# Patient Record
Sex: MALE | Race: WHITE
[De-identification: names, ages, dates, MRNs, and addresses within clinical notes are randomized per-mention and may not be internally consistent; named-entity substitution may affect disease eponyms.]

---

## 2018-06-05 ENCOUNTER — HOSPITAL ENCOUNTER (EMERGENCY)
Dept: HOSPITAL 97 - ER | Age: 23
Discharge: HOME | End: 2018-06-05
Payer: SELF-PAY

## 2018-06-05 VITALS — OXYGEN SATURATION: 98 % | SYSTOLIC BLOOD PRESSURE: 110 MMHG | DIASTOLIC BLOOD PRESSURE: 74 MMHG

## 2018-06-05 VITALS — TEMPERATURE: 98 F

## 2018-06-05 DIAGNOSIS — R06.4: ICD-10-CM

## 2018-06-05 DIAGNOSIS — E86.0: ICD-10-CM

## 2018-06-05 DIAGNOSIS — Z88.2: ICD-10-CM

## 2018-06-05 DIAGNOSIS — R42: Primary | ICD-10-CM

## 2018-06-05 DIAGNOSIS — F17.210: ICD-10-CM

## 2018-06-05 LAB
METHAMPHET UR QL SCN: POSITIVE
THC SERPL-MCNC: NEGATIVE NG/ML

## 2018-06-05 PROCEDURE — 80320 DRUG SCREEN QUANTALCOHOLS: CPT

## 2018-06-05 PROCEDURE — 99284 EMERGENCY DEPT VISIT MOD MDM: CPT

## 2018-06-05 PROCEDURE — 36415 COLL VENOUS BLD VENIPUNCTURE: CPT

## 2018-06-05 PROCEDURE — 80307 DRUG TEST PRSMV CHEM ANLYZR: CPT

## 2018-06-05 PROCEDURE — 96360 HYDRATION IV INFUSION INIT: CPT

## 2018-06-05 PROCEDURE — 81003 URINALYSIS AUTO W/O SCOPE: CPT

## 2018-06-05 PROCEDURE — 93005 ELECTROCARDIOGRAM TRACING: CPT

## 2018-06-05 NOTE — ER
Nurse's Notes                                                                                     

 [facility]                                                                

Name: Eddie Lopez                                                                                

Age: 22 yrs                                                                                       

Sex: Male                                                                                         

: 1995                                                                                   

MRN: [id number]                                                                                   

Arrival Date: 2018                                                                          

Time: 12:31                                                                                       

Account#: [id number]                                                                            

Bed 18                                                                                            

Private MD:                                                                                       

Diagnosis: Dehydration;Dizziness and giddiness;Hyperventilation                                   

                                                                                                  

Presentation:                                                                                     

                                                                                             

12:35 Presenting complaint: Patient states: "I feel like I am having a heat stroke". pt c/o   aa5 

      dizziness, pt noted to be diaphoretic in triage and restless. Pt states "I was out          

      drinking last night and today I've been walking around all day". Pt also c/o nausea and     

      abd pain.                                                                                   

12:35 Method Of Arrival: Ambulatory                                                           aa5 

12:35 Transition of care: patient was not received from another setting of care. Onset of     aa5 

      symptoms was 2018. Risk Assessment: Do you want to hurt yourself or someone        

      else? Patient reports no desire to harm self or others. Initial Sepsis Screen: Does the     

      patient meet any 2 criteria? No. Patient's initial sepsis screen is negative. Does the      

      patient have a suspected source of infection? No. Patient's initial sepsis screen is        

      negative. Care prior to arrival: None.                                                      

12:35 Acuity: FAMILIA 3                                                                           aa5 

                                                                                                  

Historical:                                                                                       

- Allergies:                                                                                      

12:35 Sulfa (Sulfonamide Antibiotics);                                                        aa5 

- PMHx:                                                                                           

12:35 Anxiety; Bipolar disorder; Schizophrenia;                                               aa5 

- PSHx:                                                                                           

12:35 None;                                                                                   aa5 

                                                                                                  

- Immunization history:: Adult Immunizations unknown.                                             

- Social history:: Smoking status: Patient uses tobacco products, smokes one pack                 

  cigarettes per day.                                                                             

- Ebola Screening: : No symptoms or risks identified at this time.                                

- Family history:: not pertinent.                                                                 

- Hospitalizations: : No recent hospitalization is reported.                                      

                                                                                                  

                                                                                                  

Screenin:45 Abuse screen: Denies threats or abuse. Denies injuries from another. Nutritional        jl7 

      screening: No deficits noted. Tuberculosis screening: No symptoms or risk factors           

      identified. Fall Risk IV access (20 points). Total Chen Fall Scale indicates No Risk       

      (0-24 pts).                                                                                 

                                                                                                  

Assessment:                                                                                       

12:45 General: Appears uncomfortable, Behavior is agitated, anxious. Pain: Denies pain.       jl7 

      Neuro: Level of Consciousness is awake, alert, obeys commands, Oriented to person,          

      place, time, situation. Cardiovascular: Heart tones S1 S2 present Rhythm is sinus           

      tachycardia. Respiratory: Airway is patent Respiratory effort is even, unlabored,           

      Respiratory pattern is regular, symmetrical. GI: Reports nausea, Patient currently          

      denies diarrhea, vomiting. : Reports inability to void, since weeks ago. EENT: No         

      signs and/or symptoms were reported regarding the EENT system. Derm: Skin is pink, warm     

      \T\ dry. Musculoskeletal: No signs and/or symptoms reported regarding the musculoskeletal   

      system.                                                                                     

14:00 Reassessment: pt laying in bed with eyes closed respirations even and unlabored, no     jl7 

      signs of distress noted at this time.                                                       

15:30 Reassessment: Patient and/or family updated on plan of care and expected duration. Pain jl7 

      level reassessed. Patient states feeling better.                                            

16:00 Reassessment: pt discharged and sat out in lobby area, pt noted to be stumbling and     jl7 

      attempting to use the water fountain as a urinal. Pt placed back in exam room and           

      provider notified.                                                                          

16:17 Reassessment: Pt states "I feel so much better now but I'm starving." Diet tray ordered.jl7 

16:30 Reassessment: Diet tray delievered.                                                     jl7 

                                                                                                  

Vital Signs:                                                                                      

12:40 Pulse 110; Resp 22 S; Temp 98.0(O); Pulse Ox 98% on R/A; Weight 95.25 kg (R); Height 5  aa5 

      ft. 2 in. (157.48 cm) (R); Pain 7/10;                                                       

14:27  / 72; Pulse 61; Resp 16; Pulse Ox 100% ;                                         jl7 

15:30  / 74; Pulse 58; Resp 16; Pulse Ox 98% ;                                          jl7 

12:40 Body Mass Index 38.41 (95.25 kg, 157.48 cm)                                             aa5 

12:40 Unable to obtain BP at this time, pt anxious and restless                               aa5 

                                                                                                  

ED Course:                                                                                        

12:31 Patient arrived in ED.                                                                  aa5 

12:32 Jose Hartman, JUSTINE is Primary Nurse.                                                      jl7 

12:35 Arm band placed on.                                                                     aa5 

12:37 Iam Walker MD is Attending Physician.                                                rn  

12:39 Triage completed.                                                                       aa5 

12:45 Patient has correct armband on for positive identification. Placed in gown. Bed in low  jl7 

      position. Call light in reach. Side rails up X2. Cardiac monitor on. Pulse ox on. NIBP      

      on. Warm blanket given.                                                                     

13:39 EKG done, by EKG tech. reviewed by Iam Walker MD.                                      at1 

13:45 Initial lab(s) drawn, by me, sent to lab. Inserted saline lock: 20 gauge in right       jl7 

      antecubital area, using aseptic technique. Blood collected.                                 

13:47 ETOH Level Sent.                                                                        jl7 

15:51 No provider procedures requiring assistance completed. IV discontinued, intact,         jl7 

      bleeding controlled, No redness/swelling at site. Pressure dressing applied.                

16:00 Primary Nurse role handed off by Jose Hartman RN                                       jl7 

16:13 Jose Hartman RN is Primary Nurse.                                                      jl7 

16:13 Urine Drug Screen Sent.                                                                 jl7 

16:14 Urine collected: clean catch specimen, cindy colored.                                   jl7 

                                                                                                  

Administered Medications:                                                                         

13:25 Drug: NS 0.9% 1000 ml Route: IV; Rate: 1000 ml; Site: right antecubital;                jl7 

14:45 Follow up: IV Status: Completed infusion                                                jl7 

13:25 Drug: NS 0.9% 1000 ml Route: IV; Rate: 1000 ml; Site: right antecubital;                jl7 

14:45 Follow up: IV Status: Completed infusion                                                jl7 

                                                                                                  

                                                                                                  

Outcome:                                                                                          

15:34 Discharge ordered by MD.                                                                rn  

15:51 Discharged to home ambulatory.                                                          jl7 

15:51 Condition: stable                                                                           

15:51 Discharge instructions given to patient, family, Instructed on discharge instructions,      

      follow up and referral plans. Demonstrated understanding of instructions, follow-up         

      care.                                                                                       

15:52 Patient left the ED.                                                                    jl7 

17:00 Eloped from patient exam room, after seeing physician Time discovered patient gone:     jl7 

      2018 at 16:55                                                                      

17:54 Patient left the ED.                                                                    jl7 

                                                                                                  

Signatures:                                                                                       

Iam Walker MD MD rn Calderon, Audri, RN                     RN   aa5                                                  

Marine grigsby, EKG Tech              EKG Tat1                                                  

Jose Hartman RN RN   jl7                                                  

                                                                                                  

Corrections: (The following items were deleted from the chart)                                    

12:40 12:35 Presenting complaint: Patient states: "I feel like I am having a heat stroke". pt aa5 

      c/o dizziness, pt noted to be diaphoretic in triage and restless. Pt states "I was out      

      drinking last night and today I've been walking around all day" aa5                         

                                                                                                  

**************************************************************************************************

## 2018-06-05 NOTE — EDPHYS
Physician Documentation                                                                           

 [facility]                                                                

Name: Eddie Lopez                                                                                

Age: 22 yrs                                                                                       

Sex: Male                                                                                         

: 1995                                                                                   

MRN: [id number]                                                                                   

Arrival Date: 2018                                                                          

Time: 12:31                                                                                       

Account#: [id number]                                                                            

Bed 18                                                                                            

Private MD:                                                                                       

ED Physician Iam Walker                                                                         

HPI:                                                                                              

                                                                                             

13:32 This 22 yrs old  Male presents to ER via Ambulatory with complaints of         rn  

      Dizziness.                                                                                  

13:32 The patient presents with generalized weakness, lightheadedness. Onset: The             rn  

      symptoms/episode began/occurred today. Modifying factors: The symptoms are alleviated       

      by lying down, the symptoms are aggravated by standing up. Associated signs and             

      symptoms: Pertinent positives: palpitations, Pertinent negatives: abdominal pain, focal     

      weakness, seizure, shortness of breath, syncope, vomiting. Severity of symptoms: At         

      their worst the symptoms were moderate in the emergency department the symptoms are         

      unchanged. It is unknown whether or not the patient has had similar symptoms in the         

      past. Reports almost no water intake for 3 days, has drank about 24 pack of beer in 3       

      days hanging out with girl and friends, reports walking a lot outside today, feels          

      dehydrated, no focal pain or weakness, + lightheaded, improved when resting and laying      

      down, denies drug use, not suicidal/homicidal, not hallucinating..                          

                                                                                                  

Historical:                                                                                       

- Allergies:                                                                                      

12:35 Sulfa (Sulfonamide Antibiotics);                                                        aa5 

- PMHx:                                                                                           

12:35 Anxiety; Bipolar disorder; Schizophrenia;                                               aa5 

- PSHx:                                                                                           

12:35 None;                                                                                   aa5 

                                                                                                  

- Immunization history:: Adult Immunizations unknown.                                             

- Social history:: Smoking status: Patient uses tobacco products, smokes one pack                 

  cigarettes per day.                                                                             

- Ebola Screening: : No symptoms or risks identified at this time.                                

- Family history:: not pertinent.                                                                 

- Hospitalizations: : No recent hospitalization is reported.                                      

                                                                                                  

                                                                                                  

ROS:                                                                                              

13:32 Constitutional: Negative for fever, chills, and weight loss, Eyes: Negative for injury, rn  

      pain, redness, and discharge, Neck: Negative for injury, pain, and swelling,                

      Cardiovascular: Negative for chest pain, and edema, Respiratory: Negative for shortness     

      of breath, cough, wheezing, and pleuritic chest pain, Abdomen/GI: Negative for              

      abdominal pain, nausea, vomiting, diarrhea, and constipation, Back: Negative for injury     

      and pain, MS/Extremity: Negative for injury and deformity, Skin: Negative for injury,       

      rash, and discoloration, Neuro: Negative for headache, numbness, tingling, and seizure.     

                                                                                                  

Exam:                                                                                             

13:32 Constitutional:  This is a well developed, well nourished patient who is awake, alert,  rn  

      and in no acute distress. Pacing around room, appears anxious/agitated. Head/Face:          

      Normocephalic, atraumatic. Eyes:  Pupils equal round and reactive to light,                 

      extra-ocular motions intact.  Lids and lashes normal.  Conjunctiva and sclera are           

      non-icteric and not injected.  Cornea within normal limits.  Periorbital areas with no      

      swelling, redness, or edema. ENT:  dry MM Neck:  Trachea midline, no thyromegaly or         

      masses palpated, and no cervical lymphadenopathy.  Supple, full range of motion without     

      nuchal rigidity, or vertebral point tenderness.  No Meningismus. Cardiovascular:            

      tachycardic, regular, no murmur Respiratory:  mild tachypnea, clear bilateral breath        

      sounds Abdomen/GI:  Soft, non-tender, with normal bowel sounds.  No distension or           

      tympany.  No guarding or rebound.  No evidence of tenderness throughout. MS/ Extremity:     

       Pulses equal, no cyanosis.  Neurovascular intact.  Full, normal range of motion.           

      Equal circumference. Neuro:  Awake and alert, GCS 15, oriented to person, place, time,      

      and situation.  Cranial nerves II-XII grossly intact.  Motor strength 5/5 in all            

      extremities.  Sensory grossly intact.  Cerebellar exam normal.  Normal gait.                

                                                                                                  

Vital Signs:                                                                                      

12:40 Pulse 110; Resp 22 S; Temp 98.0(O); Pulse Ox 98% on R/A; Weight 95.25 kg (R); Height 5  aa5 

      ft. 2 in. (157.48 cm) (R); Pain 7/10;                                                       

14:27  / 72; Pulse 61; Resp 16; Pulse Ox 100% ;                                         jl7 

15:30  / 74; Pulse 58; Resp 16; Pulse Ox 98% ;                                          jl7 

12:40 Body Mass Index 38.41 (95.25 kg, 157.48 cm)                                             aa5 

12:40 Unable to obtain BP at this time, pt anxious and restless                               aa5 

                                                                                                  

MDM:                                                                                              

12:37 Patient medically screened.                                                             rn  

15:33 Differential diagnosis: generalized weakness, hyperventilation, hypovolemia, idiopathic rn  

      dizziness. Data reviewed: vital signs, nurses notes, lab test result(s).                    

15:33 Counseling: I had a detailed discussion with the patient and/or guardian regarding: the rn  

      historical points, exam findings, and any diagnostic results supporting the                 

      discharge/admit diagnosis, lab results, the need for outpatient follow up, to return to     

      the emergency department if symptoms worsen or persist or if there are any questions or     

      concerns that arise at home. Response to treatment: the patient's symptoms have             

      markedly improved after treatment, the patient's condition has returned to base line,       

      the patient is now symptom free, patient is well hydrated. and as a result, I will          

      discharge patient. Special discussion: I discussed with the patient/guardian in detail      

      that at this point there is no indication for admission to the hospital. It is              

      understood, however, that if the symptoms persist or worsen the patient needs to return     

      immediately for re-evaluation.                                                              

                                                                                                  

                                                                                             

12:59 Order name: ETOH Level                                                                  rn  

                                                                                             

12:59 Order name: Urine Drug Screen; Complete Time: 17:00                                     rn  

                                                                                             

13:00 Order name: Alcohol Serum/Plasma; Complete Time: 15:33                                  EDMS

                                                                                             

16:18 Order name: Urine Dipstick--Ancillary (enter results)                                   bd  

                                                                                             

12:59 Order name: IV Start; Complete Time: 13:46                                              rn  

                                                                                             

12:59 Order name: EKG; Complete Time: 12:59                                                   rn  

                                                                                             

12:59 Order name: EKG - Nurse/Tech; Complete Time: 13:21                                      rn  

                                                                                             

16:13 Order name: Diet Regular; Complete Time: 16:13                                          jl 

                                                                                                  

Administered Medications:                                                                         

13:25 Drug: NS 0.9% 1000 ml Route: IV; Rate: 1000 ml; Site: right antecubital;                jl7 

14:45 Follow up: IV Status: Completed infusion                                                jl7 

13:25 Drug: NS 0.9% 1000 ml Route: IV; Rate: 1000 ml; Site: right antecubital;                jl7 

14:45 Follow up: IV Status: Completed infusion                                                jl7 

                                                                                                  

                                                                                                  

Disposition:                                                                                      

18 15:34 Discharged to Home. Impression: Dehydration, Dizziness and giddiness,              

  Hyperventilation.                                                                               

- Condition is Stable.                                                                            

- Discharge Instructions: Dehydration, Adult, Dizziness, Hyperventilation.                        

                                                                                                  

- Medication Reconciliation Form, Thank You Letter, Antibiotic Education, Prescription            

  Opioid Use form.                                                                                

- Follow up: Private Physician; When: As needed; Reason: Recheck today's complaints,              

  Re-evaluation by your physician.                                                                

- Problem is new.                                                                                 

- Symptoms have improved.                                                                         

                                                                                                  

                                                                                                  

                                                                                                  

Signatures:                                                                                       

Dispatcher [employer]                           EDMS                                                 

Iam Walker MD MD rn Calderon, Audri RN                     RN   aa5                                                  

Jose Hartman RN                        RN   jl7                                                  

                                                                                                  

Corrections: (The following items were deleted from the chart)                                    

15:52 15:34 2018 15:34 Discharged to Home. Impression: Dehydration; Dizziness and       jl7 

      giddiness; Hyperventilation. Condition is Stable. Forms are Medication Reconciliation       

      Form, Thank You Letter, Antibiotic Education, Prescription Opioid Use. Follow up:           

      Private Physician; When: As needed; Reason: Recheck today's complaints, Re-evaluation       

      by your physician. Problem is new. Symptoms have improved. rn                               

17:54 15:52 2018 15:34 Discharged to Home. Impression: Dehydration; Dizziness and       jl7 

      giddiness; Hyperventilation. Condition is Stable. Discharge Instructions: Dehydration,      

      Adult, Dizziness, Hyperventilation. Forms are Medication Reconciliation Form, Thank You     

      Letter, Antibiotic Education, Prescription Opioid Use. Follow up: Private Physician;        

      When: As needed; Reason: Recheck today's complaints, Re-evaluation by your physician.       

      Problem is new. Symptoms have improved. jl7                                                 

                                                                                                  

**************************************************************************************************

## 2018-06-06 NOTE — EKG
Test Date:    2018-06-05               Test Time:    13:22:17

Technician:   DAVID/                                   

                                                     

MEASUREMENT RESULTS:                                       

Intervals:                                           

Rate:         74                                     

RI:           122                                    

QRSD:         102                                    

QT:           366                                    

QTc:          406                                    

Axis:                                                

P:            65                                     

RI:           122                                    

QRS:          91                                     

T:            58                                     

                                                     

INTERPRETIVE STATEMENTS:                                       

                                                     

Normal sinus rhythm with sinus arrhythmia

Rightward axis

Inferior infarct, age undetermined

Abnormal ECG

No previous ECG available for comparison



Electronically Signed On 06-06-18 07:43:01 CDT by Todd Solano

## 2018-12-04 ENCOUNTER — HOSPITAL ENCOUNTER (EMERGENCY)
Dept: HOSPITAL 97 - ER | Age: 23
Discharge: HOME | End: 2018-12-04
Payer: SELF-PAY

## 2018-12-04 VITALS — TEMPERATURE: 98.4 F | DIASTOLIC BLOOD PRESSURE: 84 MMHG | SYSTOLIC BLOOD PRESSURE: 151 MMHG | OXYGEN SATURATION: 100 %

## 2018-12-04 DIAGNOSIS — J34.0: Primary | ICD-10-CM

## 2018-12-04 DIAGNOSIS — Z72.0: ICD-10-CM

## 2018-12-04 PROCEDURE — 99281 EMR DPT VST MAYX REQ PHY/QHP: CPT

## 2018-12-04 NOTE — ER
Nurse's Notes                                                                                     

 [facility]                                                                

Name: Eddie Lopez                                                                                

Age: 23 yrs                                                                                       

Sex: Male                                                                                         

: 1995                                                                                   

MRN: [id number]                                                                                   

Arrival Date: 2018                                                                          

Time: 13:45                                                                                       

Account#: [id number]                                                                            

Bed 10                                                                                            

Private MD: None, None                                                                            

Diagnosis: Abscess, furuncle and carbuncle of nose                                                

                                                                                                  

Presentation:                                                                                     

                                                                                             

13:50 Presenting complaint: Patient states: right nostril abscess started today after he      sv  

      lanced the pimple at home. Pt also reports another one maybe on the left 2nd digit.         

      Transition of care: patient was not received from another setting of care. Onset of         

      symptoms was 2018. Care prior to arrival: None.                                

13:50 Method Of Arrival: Ambulatory                                                           sv  

13:50 Acuity: FAMILIA 4                                                                           sv  

13:50 Risk Assessment: Do you want to hurt yourself or someone else? Patient reports no       iw  

      desire to harm self or others. Initial Sepsis Screen: Does the patient meet any 2           

      criteria? No. Patient's initial sepsis screen is negative. Does the patient have a          

      suspected source of infection? No. Patient's initial sepsis screen is negative.             

                                                                                                  

Triage Assessment:                                                                                

14:10 General: Appears in no apparent distress.                                               iw  

14:40 General: Behavior is calm.                                                              iw  

                                                                                                  

Historical:                                                                                       

- Allergies:                                                                                      

13:51 Sulfa (Sulfonamide Antibiotics);                                                        sv  

- PMHx:                                                                                           

13:51 Anxiety; Bipolar disorder; Schizophrenia;                                               sv  

- PSHx:                                                                                           

13:51 None;                                                                                   sv  

                                                                                                  

- Immunization history:: Flu vaccine is not up to date.                                           

- Social history:: Smoking status: Patient uses tobacco products, denies chronic                  

  smoking, but will smoke occasionally.                                                           

- Ebola Screening: : No symptoms or risks identified at this time.                                

                                                                                                  

                                                                                                  

Screenin:40 Abuse screen: Denies threats or abuse. Denies injuries from another. Nutritional        iw  

      screening: No deficits noted. Tuberculosis screening: No symptoms or risk factors           

      identified. Fall Risk None identified.                                                      

                                                                                                  

Assessment:                                                                                       

14:00 General: Appears in no apparent distress. Pain: Complains of pain in right nostril.     iw  

      Neuro: Level of Consciousness is awake, alert, obeys commands, Oriented to person,          

      place, time, situation. Cardiovascular: Patient's skin is warm and dry. Respiratory:        

      Respiratory effort is even, unlabored, Respiratory pattern is regular, symmetrical. GI:     

      No signs and/or symptoms were reported involving the gastrointestinal system. Derm:         

      Skin is intact, is healthy with good turgor. Musculoskeletal: Range of motion: intact       

      in all extremities.                                                                         

                                                                                                  

Vital Signs:                                                                                      

13:51  / 84; Pulse 64; Resp 16; Temp 98.4; Pulse Ox 100% ; Weight 99.79 kg; Height 6    sv  

      ft. 2 in. (187.96 cm); Pain 7/10;                                                           

13:51 Body Mass Index 28.25 (99.79 kg, 187.96 cm)                                             sv  

                                                                                                  

ED Course:                                                                                        

13:45 Patient arrived in ED.                                                                  mr  

13:45 Pedro Marquez MD is Private Physician.                                             mr  

13:45 None, None is Private Physician.                                                        mr  

13:50 Patient has correct armband on for positive identification.                             iw  

13:51 Triage completed.                                                                       sv  

13:51 Arm band placed on.                                                                     sv  

13:57 Blanche Perdomo RN is Primary Nurse.                                                   iw  

14:06 Quentin Edmond MD is Attending Physician.                                              gs  

14:40 No provider procedures requiring assistance completed. Patient did not have IV access   iw  

      during this emergency room visit.                                                           

                                                                                                  

Administered Medications:                                                                         

No medications were administered                                                                  

                                                                                                  

                                                                                                  

Outcome:                                                                                          

14:27 Discharge ordered by MD.                                                                gs  

14:40 Discharged to home ambulatory.                                                          iw  

14:40 Condition: good                                                                             

14:40 Discharge instructions given to patient.                                                    

14:41 Patient left the ED.                                                                    iw  

                                                                                                  

Signatures:                                                                                       

Brittany Carey RN RN                                                      

Alma Pyle                                 mr                                                   

Blanche Perdomo, JUSTINE FLETCHER                                                      

Quentin Edmond MD MD   gs                                                   

                                                                                                  

**************************************************************************************************

## 2018-12-05 ENCOUNTER — HOSPITAL ENCOUNTER (EMERGENCY)
Dept: HOSPITAL 97 - ER | Age: 23
Discharge: ANOTHER HOSPITAL | End: 2018-12-05
Payer: SELF-PAY

## 2018-12-05 VITALS — SYSTOLIC BLOOD PRESSURE: 139 MMHG | OXYGEN SATURATION: 98 % | DIASTOLIC BLOOD PRESSURE: 76 MMHG

## 2018-12-05 VITALS — TEMPERATURE: 98.1 F

## 2018-12-05 DIAGNOSIS — L03.211: Primary | ICD-10-CM

## 2018-12-05 DIAGNOSIS — Z88.2: ICD-10-CM

## 2018-12-05 DIAGNOSIS — F17.210: ICD-10-CM

## 2018-12-05 LAB
BUN BLD-MCNC: 14 MG/DL (ref 7–18)
GLUCOSE SERPLBLD-MCNC: 80 MG/DL (ref 74–106)
HCT VFR BLD CALC: 41.6 % (ref 39.6–49)
LYMPHOCYTES # SPEC AUTO: 1.7 K/UL (ref 0.7–4.9)
MCH RBC QN AUTO: 31.4 PG (ref 27–35)
MCV RBC: 88.4 FL (ref 80–100)
PMV BLD: 9.3 FL (ref 7.6–11.3)
POTASSIUM SERPL-SCNC: 3.7 MMOL/L (ref 3.5–5.1)
RBC # BLD: 4.7 M/UL (ref 4.33–5.43)

## 2018-12-05 PROCEDURE — 80048 BASIC METABOLIC PNL TOTAL CA: CPT

## 2018-12-05 PROCEDURE — 96374 THER/PROPH/DIAG INJ IV PUSH: CPT

## 2018-12-05 PROCEDURE — 36415 COLL VENOUS BLD VENIPUNCTURE: CPT

## 2018-12-05 PROCEDURE — 99285 EMERGENCY DEPT VISIT HI MDM: CPT

## 2018-12-05 PROCEDURE — 70487 CT MAXILLOFACIAL W/DYE: CPT

## 2018-12-05 PROCEDURE — 76377 3D RENDER W/INTRP POSTPROCES: CPT

## 2018-12-05 PROCEDURE — 85025 COMPLETE CBC W/AUTO DIFF WBC: CPT

## 2018-12-05 PROCEDURE — 87040 BLOOD CULTURE FOR BACTERIA: CPT

## 2018-12-05 PROCEDURE — 96375 TX/PRO/DX INJ NEW DRUG ADDON: CPT

## 2018-12-05 NOTE — ER
Nurse's Notes                                                                                     

 [facility]                                                                

Name: Eddie Lopez                                                                                

Age: 23 yrs                                                                                       

Sex: Male                                                                                         

: 1995                                                                                   

MRN: [id number]                                                                                   

Arrival Date: 2018                                                                          

Time: 14:14                                                                                       

Account#: [id number]                                                                            

Bed 17                                                                                            

Private MD:                                                                                       

Diagnosis: Facial Cellulitis;Facial abscesses                                                     

                                                                                                  

Presentation:                                                                                     

                                                                                             

14:16 Presenting complaint: Patient states: i came here yesterday and they told me to come    tw2 

      back if it wasn't better, it is not and it is painful. Transition of care: patient was      

      not received from another setting of care. Onset of symptoms was 2018.         

      Risk Assessment: Do you want to hurt yourself or someone else? Patient reports no           

      desire to harm self or others. Initial Sepsis Screen: Does the patient meet any 2           

      criteria? No. Patient's initial sepsis screen is negative. Does the patient have a          

      suspected source of infection? Yes: Skin breakdown/wound. Care prior to arrival: None.      

14:16 Method Of Arrival: Ambulatory                                                           tw2 

14:16 Acuity: FAMILIA 3                                                                           tw2 

                                                                                                  

Historical:                                                                                       

- Allergies:                                                                                      

14:18 Sulfa (Sulfonamide Antibiotics);                                                        tw2 

- PMHx:                                                                                           

14:18 Anxiety; Schizophrenia; Bipolar disorder;                                               tw2 

- PSHx:                                                                                           

14:18 None;                                                                                   tw2 

                                                                                                  

- Immunization history:: Adult Immunizations up to date.                                          

- Social history:: Smoking status: Patient uses tobacco products, smokes one-half pack            

  cigarettes per day.                                                                             

- Ebola Screening: : Patient denies travel to an Ebola-affected area in the 21 days               

  before illness onset.                                                                           

- Family history:: not pertinent.                                                                 

- Hospitalizations: : No recent hospitalization is reported.                                      

                                                                                                  

                                                                                                  

Screening:                                                                                        

15:07 Abuse screen: Denies threats or abuse. Denies injuries from another. Nutritional        aj  

      screening: No deficits noted. Tuberculosis screening: No symptoms or risk factors           

      identified. Fall Risk None identified.                                                      

                                                                                                  

Assessment:                                                                                       

15:07 General: Appears in no apparent distress. uncomfortable, Behavior is calm, cooperative, aj  

      appropriate for age. Pain: Complains of pain in face and mouth. Neuro: Level of             

      Consciousness is awake, alert, obeys commands, Oriented to person, place, time,             

      situation, Appropriate for age. Respiratory: Airway is patent Respiratory effort is         

      even, unlabored, Respiratory pattern is regular, symmetrical. EENT: swelling to right       

      upper lip. Derm: Skin is intact, is healthy with good turgor, Skin is pink, warm \T\ dry.   

      normal.                                                                                     

18:38 Reassessment: Patient appears in no apparent distress at this time. No changes from     aj  

      previously documented assessment. Patient and/or family updated on plan of care and         

      expected duration. Pain level reassessed. Patient is alert, oriented x 3, equal             

      unlabored respirations, skin warm/dry/pink. Patient states feeling better. Patient          

      states symptoms have improved.                                                              

19:00 Reassessment: Patient appears in no apparent distress at this time. Patient is alert,   aa1 

      oriented x 3, equal unlabored respirations, skin warm/dry/pink. [facility] present for          

      transport to [facility].                                                         

                                                                                                  

Vital Signs:                                                                                      

14:17  / 85; Pulse 96; Resp 18; Temp 98.1(TE); Pulse Ox 98% on R/A; Pain 10/10;         tw2 

17:21  / 86; Pulse 78; Resp 19; Pulse Ox 99% on R/A;                                    aj  

18:38  / 76; Pulse 89; Resp 20; Pulse Ox 98% on R/A;                                    aj  

                                                                                                  

ED Course:                                                                                        

14:14 Patient arrived in ED.                                                                  as  

14:17 Triage completed.                                                                       tw2 

14:18 Arm band placed on.                                                                     tw2 

14:26 Iam Walker MD is Attending Physician.                                                rn  

14:45 Radiology exam delayed due to lab results not completed at this time. (BUN/Creatinine). vm2 

14:51 Marine Olvera, RN is Primary Nurse.                                                     aj  

15:07 Patient has correct armband on for positive identification.                             aj  

15:07 Inserted saline lock: 18 gauge in right antecubital area, using aseptic technique.      aj  

      Blood collected.                                                                            

15:59 Patient moved to CT via wheelchair.                                                     nj  

16:05 CT completed. Patient tolerated procedure well. Patient moved back from CT.             nj  

17:10 initiated a transfer with Ethel at the [facility] transfer center.                   eb  

17:15 connected the hospitalist  from [facility] with Dr. Walker for patient    eb  

      transfer consultation.                                                                      

17:51 connected the ENT on call Dr. Tang with  for patient transfer consultation.   eb  

18:39 Report given to Ever FLETCHER, [facility].                                                  aj  

19:00 No provider procedures requiring assistance completed. Patient transferred, IV remains  aa1 

      in place.                                                                                   

                                                                                                  

Administered Medications:                                                                         

15:06 Drug: Decadron - Dexamethasone 10 mg Route: IVP; Site: right antecubital;               aj  

15:06 Drug: NS 0.9% 1000 ml Route: IV; Rate: 1 bolus; Site: right antecubital;                aj  

15:07 Drug: Clindamycin 900 mg Route: IVPB; Infused Over: 30 mins; Site: right antecubital;   aj  

17:45 Drug: Demerol 25 mg Route: IVP; Site: right antecubital;                                aj  

19:00 Follow up: Response: No adverse reaction; Pain is decreased                             aa1 

                                                                                                  

                                                                                                  

Outcome:                                                                                          

17:33 ER care complete, transfer ordered by MD.                                               rn  

19:00 Transferred by ground EMS                                                               aa1 

19:00 Condition: stable                                                                           

19:00 Instructed on the need for transfer, Demonstrated understanding of instructions.            

19:26 Patient left the ED.                                                                    aa1 

                                                                                                  

Signatures:                                                                                       

Karla Marc RN RN   aa1                                                  

Marine Olvera RN RN aj Martinez, Amelia as Nieto, Roman, MD MD rn Wise, Tara, RN RN   2                                                  

Cristobal Torres Victoria                            [facility]                                                  

Meliza Rees                                                   

                                                                                                  

Corrections: (The following items were deleted from the chart)                                    

14:19 14:17 Pulse 96bpm; Resp 18bpm; Pulse Ox 98% RA; Temp 98.1F Temporal; Pain 10/10; tw2    tw2 

17:54 17:51 connected the ENT on call with  for patient transfer consultation. ramos beasley  

                                                                                                  

**************************************************************************************************

## 2018-12-05 NOTE — EDPHYS
Physician Documentation                                                                           

 [facility]                                                                

Name: Eddie Lopez                                                                                

Age: 23 yrs                                                                                       

Sex: Male                                                                                         

: 1995                                                                                   

MRN: [id number]                                                                                   

Arrival Date: 2018                                                                          

Time: 14:14                                                                                       

Account#: [id number]                                                                            

Bed 17                                                                                            

Private MD:                                                                                       

ED Physician Iam Walker                                                                         

HPI:                                                                                              

                                                                                             

14:35 This 23 yrs old  Male presents to ER via Ambulatory with complaints of Facial  rn  

      Swelling.                                                                                   

14:35 the patient presents with a swollen area of the mouth. Onset: The symptoms/episode      rn  

      began/occurred yesterday. Possible cause(s): unknown. Severity of symptoms: At their        

      worst the symptoms were mild, in the emergency department the symptoms are unchanged.       

      The patient has not experienced similar symptoms in the past. The patient has been          

      recently seen at the [facility] Emergency Department. Seen here      

      yesterday by Dr. Edmond, reports began as pimple, got more swollen, able to express some     

      pus on his own, reports sent home with prescriptions for unknown meds, didn't get them      

      filled, now returns because swelling is worse, has spread to upper lip and right cheek.     

      .                                                                                           

                                                                                                  

Historical:                                                                                       

- Allergies:                                                                                      

14:18 Sulfa (Sulfonamide Antibiotics);                                                        tw2 

- PMHx:                                                                                           

14:18 Anxiety; Schizophrenia; Bipolar disorder;                                               tw2 

- PSHx:                                                                                           

14:18 None;                                                                                   tw2 

                                                                                                  

- Immunization history:: Adult Immunizations up to date.                                          

- Social history:: Smoking status: Patient uses tobacco products, smokes one-half pack            

  cigarettes per day.                                                                             

- Ebola Screening: : Patient denies travel to an Ebola-affected area in the 21 days               

  before illness onset.                                                                           

- Family history:: not pertinent.                                                                 

- Hospitalizations: : No recent hospitalization is reported.                                      

                                                                                                  

                                                                                                  

ROS:                                                                                              

14:35 Constitutional: Negative for fever, chills, and weight loss, Eyes: Negative for injury, rn  

      pain, redness, and discharge, ENT: + facial swelling and edema Neck: Negative for           

      injury, pain, and swelling, Cardiovascular: Negative for chest pain, palpitations, and      

      edema, Respiratory: Negative for shortness of breath, cough, wheezing, and pleuritic        

      chest pain, Abdomen/GI: Negative for abdominal pain, nausea, vomiting, diarrhea, and        

      constipation, MS/Extremity: Negative for injury and deformity, Skin: Negative for           

      injury Neuro: Negative for headache, weakness, numbness, tingling, and seizure.             

                                                                                                  

Exam:                                                                                             

14:35 Constitutional:  This is a well developed, well nourished patient who is awake, alert,  rn  

      and in no acute distress. Head/Face:  + facial swelling with upper lip induration and       

      erythema, + pustule at edge of nostril and philtrum, no fluctuance. Eyes:  Pupils equal     

      round and reactive to light, extra-ocular motions intact.  ENT:  + poor dentition but       

      no oral abscess, no tongue swelling, no stridor Neck:  Trachea midline, no masses           

      palpated Skin:  Warm, dry MS/ Extremity:  Pulses equal, no cyanosis.  Neurovascular         

      intact.  Full, normal range of motion.  Equal circumference. Neuro:  Awake and alert,       

      GCS 15, oriented to person, place, time, and situation.  Cranial nerves II-XII grossly      

      intact.  Motor strength 5/5 in all extremities.  Sensory grossly intact.  Cerebellar        

      exam normal.  Normal gait.                                                                  

                                                                                                  

Vital Signs:                                                                                      

14:17  / 85; Pulse 96; Resp 18; Temp 98.1(TE); Pulse Ox 98% on R/A; Pain 10/10;         tw2 

17:21  / 86; Pulse 78; Resp 19; Pulse Ox 99% on R/A;                                    aj  

18:38  / 76; Pulse 89; Resp 20; Pulse Ox 98% on R/A;                                    aj  

                                                                                                  

MDM:                                                                                              

14:26 Patient medically screened.                                                             rn  

17:31 Differential diagnosis: abscess, cellulitis. Data reviewed: vital signs, nurses notes,  rn  

      lab test result(s), radiologic studies, CT scan, and as a result, I will admit patient.     

      Counseling: I had a detailed discussion with the patient and/or guardian regarding: the     

      historical points, exam findings, and any diagnostic results supporting the                 

      discharge/admit diagnosis, lab results, radiology results, the need to transfer to          

      another facility, for higher level of care, [facility] does not           

      immediately have the required specialist. Special discussion:. ED course: Consulted         

      with Dr. Reed, recommends transfer to [facility] for ENT for surgical drainage of            

      abscesses and IV abx for facial cellulitis. Pending consultation with [facility] ENT..        

                                                                                                  

                                                                                             

14:33 Order name: CBC with Diff                                                               rn  

                                                                                             

14:33 Order name: Basic Metabolic Panel                                                       rn  

                                                                                             

14:33 Order name: CT Facial Bones W/ Con \T\ Mpr                                                rn

                                                                                             

14:33 Order name: Blood Culture Adult (2)                                                     rn  

                                                                                             

15:35 Order name: Basic Metabolic Panel; Complete Time: 16:56                                 EDMS

                                                                                             

15:49 Order name: CBC with Automated Diff; Complete Time: 16:56                               EDMS

                                                                                             

14:33 Order name: IV Saline Lock - Large Bore; Complete Time: 15:07                           rn  

                                                                                             

14:39 Order name: NPO; Complete Time: 17:30                                                   rn  

                                                                                             

16:28 Order name: CT; Complete Time: 16:56                                                    EDMS

                                                                                                  

Administered Medications:                                                                         

15:06 Drug: Decadron - Dexamethasone 10 mg Route: IVP; Site: right antecubital;               aj  

15:06 Drug: NS 0.9% 1000 ml Route: IV; Rate: 1 bolus; Site: right antecubital;                aj  

15:07 Drug: Clindamycin 900 mg Route: IVPB; Infused Over: 30 mins; Site: right antecubital;   aj  

17:45 Drug: Demerol 25 mg Route: IVP; Site: right antecubital;                                aj  

19:00 Follow up: Response: No adverse reaction; Pain is decreased                             aa1 

                                                                                                  

                                                                                                  

Disposition:                                                                                      

18 17:33 Transfer ordered to [facility]. Diagnosis are Facial           

  Cellulitis, Facial abscesses.                                                                   

- Reason for transfer: Higher level of care.                                                      

- Accepting physician is .                                                                     

- Condition is Stable.                                                                            

- Problem is new.                                                                                 

- Symptoms have improved.                                                                         

                                                                                                  

                                                                                                  

                                                                                                  

Signatures:                                                                                       

Dispatcher [employer]                           EDMS                                                 

Karla Marc RN RN   aa1                                                  

Marine Olvera RN RN   aj                                                   

Iam Walker MD MD rn Wise, Tara, RN RN   tw2                                                  

                                                                                                  

Corrections: (The following items were deleted from the chart)                                    

19:26 17:33 2018 17:33 Transfer ordered to [facility]. Diagnosis is aa1 

      Facial Cellulitis; Facial abscesses. Reason for transfer: Higher level of care.             

      Accepting physician is . Condition is Stable. Problem is new. Symptoms have              

      improved. rn                                                                                

                                                                                                  

**************************************************************************************************

## 2018-12-05 NOTE — RAD REPORT
EXAM DESCRIPTION:  CT - Facial Bones W Con   Mpr - 12/5/2018 4:05 pm

 

CLINICAL HISTORY:  Facial swelling and pain

 

COMPARISON:  None.

 

TECHNIQUE:  During dynamic enhancement using nonionic IV contrast, axial 2 millimeter thick images of
 the face was obtained. Sagittal and coronal reconstruction images were generated and reviewed.

 

 The CT scan was performed using dose optimization techniques as appropriate to a performed exam incl
uding one or more of the following: Automated exposure control, adjustment of the mA and/or kV accord
ing to patient size (this includes techniques or standardized protocols for targeted exams where dose
 is matched to indication/reason for exam) and use of iterative reconstruction technique.

 

FINDINGS:  In the midline upper lip abutting abutting the columella there is a 12 millimeter round lo
w-density collection. Surrounding tissues are thickened and edematous. The fluid collection extends t
o the right of the upper lip approximately 18 x 8 mm in maximum dimension. There is additional edemat
ous surrounding tissue this right upper lip collection. Edema extends further lateral into the right-
sided cheek soft tissues. No additional focal fluid collections seen. No air or foreign body identifi
ed.

 

Pharyngeal tissues are unremarkable. No vascular abnormality. No tonsillar or tongue base abnormality
. Globes and orbital contents are unremarkable. No paranasal sinus or mastoid abnormality.

 

IMPRESSION:  12 millimeter abscess in the midline upper lip abutting the columella and extending as a
n 18 x 8 mm additional abscess in the right upper lip.

 

Thickened edematous tissues of the upper lip extending into the right-sided cheek. No additional absc
ess or focal collection in the soft tissues.

## 2018-12-24 ENCOUNTER — HOSPITAL ENCOUNTER (EMERGENCY)
Dept: HOSPITAL 97 - ER | Age: 23
Discharge: HOME | End: 2018-12-24
Payer: SELF-PAY

## 2018-12-24 VITALS — OXYGEN SATURATION: 100 % | SYSTOLIC BLOOD PRESSURE: 125 MMHG | TEMPERATURE: 98 F | DIASTOLIC BLOOD PRESSURE: 65 MMHG

## 2018-12-24 DIAGNOSIS — R33.9: ICD-10-CM

## 2018-12-24 DIAGNOSIS — N41.0: Primary | ICD-10-CM

## 2018-12-24 LAB
BUN BLD-MCNC: 11 MG/DL (ref 7–18)
GLUCOSE SERPLBLD-MCNC: 89 MG/DL (ref 74–106)
HCT VFR BLD CALC: 46.6 % (ref 39.6–49)
LYMPHOCYTES # SPEC AUTO: 1.3 K/UL (ref 0.7–4.9)
PMV BLD: 8.4 FL (ref 7.6–11.3)
POTASSIUM SERPL-SCNC: 4.3 MMOL/L (ref 3.5–5.1)
RBC # BLD: 5.26 M/UL (ref 4.33–5.43)
UA COMPLETE W REFLEX CULTURE PNL UR: (no result)

## 2018-12-24 PROCEDURE — 80048 BASIC METABOLIC PNL TOTAL CA: CPT

## 2018-12-24 PROCEDURE — 96374 THER/PROPH/DIAG INJ IV PUSH: CPT

## 2018-12-24 PROCEDURE — 81015 MICROSCOPIC EXAM OF URINE: CPT

## 2018-12-24 PROCEDURE — 87088 URINE BACTERIA CULTURE: CPT

## 2018-12-24 PROCEDURE — 87086 URINE CULTURE/COLONY COUNT: CPT

## 2018-12-24 PROCEDURE — 81003 URINALYSIS AUTO W/O SCOPE: CPT

## 2018-12-24 PROCEDURE — 96375 TX/PRO/DX INJ NEW DRUG ADDON: CPT

## 2018-12-24 PROCEDURE — 51702 INSERT TEMP BLADDER CATH: CPT

## 2018-12-24 PROCEDURE — 36415 COLL VENOUS BLD VENIPUNCTURE: CPT

## 2018-12-24 PROCEDURE — 99284 EMERGENCY DEPT VISIT MOD MDM: CPT

## 2018-12-24 PROCEDURE — 85025 COMPLETE CBC W/AUTO DIFF WBC: CPT

## 2018-12-24 NOTE — EDPHYS
Physician Documentation                                                                           

 [facility]                                                                

Name: Eddie Lopze                                                                                

Age: 23 yrs                                                                                       

Sex: Male                                                                                         

: 1995                                                                                   

MRN: [id number]                                                                                   

Arrival Date: 2018                                                                          

Time: 07:45                                                                                       

Account#: [id number]                                                                            

Bed 19                                                                                            

Private MD:                                                                                       

ED Physician Jose Corado                                                                      

HPI:                                                                                              

                                                                                             

08:03 This 23 yrs old  Male presents to ER via [employer] with complaints of    jr8 

      Urinary Retention.                                                                          

08:03 The patient presents with urinary symptoms, dribbling of urine, retention, unable to    jr8 

      void. Onset: The symptoms/episode began/occurred acutely, yesterday. Modifying factors:     

      The symptoms are alleviated by nothing, the symptoms are aggravated by urinating.           

      Associated signs and symptoms: Pertinent positives: burning with urination . Severity       

      of symptoms: At their worst the symptoms were moderate, in the emergency department the     

      symptoms are unchanged. The patient has not experienced similar symptoms in the past.       

      The patient has not recently seen a physician. Patient stated that he has had               

      unprotected sexual intercourse lately. Noticed that his urine was starting to burn.         

      Yesterday evening started to have dribbling of urine and now unable to void at all .        

                                                                                                  

Historical:                                                                                       

- Allergies:                                                                                      

07:48 Sulfa (Sulfonamide Antibiotics);                                                        ss  

- Home Meds:                                                                                      

07:48 None [Active];                                                                          ss  

- PMHx:                                                                                           

07:48 Schizophrenia; Bipolar disorder; Anxiety;                                               ss  

- PSHx:                                                                                           

07:48 None;                                                                                   ss  

                                                                                                  

- Immunization history:: Adult Immunizations up to date.                                          

- Social history:: Smoking status: Patient/guardian denies using tobacco.                         

- Ebola Screening: : Patient denies exposure to infectious person Patient denies travel           

  to an Ebola-affected area in the 21 days before illness onset.                                  

                                                                                                  

                                                                                                  

ROS:                                                                                              

08:03 Eyes: Negative for injury, pain, redness, and discharge, ENT: Negative for injury,      jr8 

      pain, and discharge, Neck: Negative for injury, pain, and swelling, Cardiovascular:         

      Negative for chest pain, palpitations, and edema, Respiratory: Negative for shortness       

      of breath, cough, wheezing, and pleuritic chest pain, Abdomen/GI: Negative for              

      abdominal pain, nausea, vomiting, diarrhea, and constipation, Back: Negative for injury     

      and pain, MS/Extremity: Negative for injury and deformity, Skin: Negative for injury,       

      rash, and discoloration, Neuro: Negative for headache, weakness, numbness, tingling,        

      and seizure.                                                                                

08:03 : Positive for urinary symptoms, burning with urination, difficulty urinating,            

      perineal pain .                                                                             

                                                                                                  

Exam:                                                                                             

08:03 Eyes:  Pupils equal round and reactive to light, extra-ocular motions intact.  Lids and jr8 

      lashes normal.  Conjunctiva and sclera are non-icteric and not injected.  Cornea within     

      normal limits.  Periorbital areas with no swelling, redness, or edema. ENT:  Nares          

      patent. No nasal discharge, no septal abnormalities noted.  Tympanic membranes are          

      normal and external auditory canals are clear.  Oropharynx with no redness, swelling,       

      or masses, exudates, or evidence of obstruction, uvula midline.  Mucous membranes           

      moist. Neck:  Trachea midline, no thyromegaly or masses palpated, and no cervical           

      lymphadenopathy.  Supple, full range of motion without nuchal rigidity, or vertebral        

      point tenderness.  No Meningismus. Cardiovascular:  Regular rate and rhythm with a          

      normal S1 and S2.  No gallops, murmurs, or rubs.  Normal PMI, no JVD.  No pulse             

      deficits. Respiratory:  Lungs have equal breath sounds bilaterally, clear to                

      auscultation and percussion.  No rales, rhonchi or wheezes noted.  No increased work of     

      breathing, no retractions or nasal flaring. Back:  No spinal tenderness.  No                

      costovertebral tenderness.  Full range of motion. Skin:  Warm, dry with normal turgor.      

      Normal color with no rashes, no lesions, and no evidence of cellulitis. MS/ Extremity:      

      Pulses equal, no cyanosis.  Neurovascular intact.  Full, normal range of motion. Neuro:     

       Awake and alert, GCS 15, oriented to person, place, time, and situation.  Cranial          

      nerves II-XII grossly intact.  Motor strength 5/5 in all extremities.  Sensory grossly      

      intact.  Cerebellar exam normal.  Normal gait.                                              

08:03 Abdomen/GI: Inspection: abdomen appears normal, Bowel sounds: active, all quadrants,        

      Palpation: soft, in all quadrants, mild abdominal tenderness, in the suprapubic area,       

      mass, is not appreciated, rebound tenderness, is not appreciated, voluntary guarding,       

      is not appreciated, involuntary guarding, is not appreciated, no appreciated                

      organomegaly, Rectal exam: Prostate: tender.                                                

                                                                                                  

Vital Signs:                                                                                      

07:52  / 65; Pulse 74; Resp 15; Temp 98.0; Pulse Ox 100% on R/A; Weight 97.52 kg;       ss  

      Height 6 ft. 2 in. (187.96 cm); Pain 5/10;                                                  

07:52 Body Mass Index 27.60 (97.52 kg, 187.96 cm)                                             ss  

                                                                                                  

MDM:                                                                                              

07:46 Patient medically screened.                                                              

09:08 Data reviewed: vital signs, nurses notes, lab test result(s), and as a result, I will   jr8 

      discharge patient. Data interpreted: Pulse oximetry: on room air is 100 %.                  

      Interpretation: normal. Counseling: I had a detailed discussion with the patient and/or     

      guardian regarding: the historical points, exam findings, and any diagnostic results        

      supporting the discharge/admit diagnosis, lab results, the need for outpatient follow       

      up, a urologist, to return to the emergency department if symptoms worsen or persist or     

      if there are any questions or concerns that arise at home.                                  

                                                                                                  

                                                                                             

07:47 Order name: CBC with Diff; Complete Time: 08:30                                         [facility] 

                                                                                             

07:47 Order name: Basic Metabolic Panel; Complete Time: 08:37                                 [facility] 

                                                                                             

07:47 Order name: Urine Culture                                                               [facility] 

                                                                                             

07:47 Order name: Urine Microscopic Only; Complete Time: 09:08                                [facility] 

                                                                                             

08:59 Order name: Urine Dipstick--Ancillary (enter results); Complete Time: 09:39               

                                                                                             

07:47 Order name: IV; Complete Time: 08:17                                                    [facility] 

                                                                                             

07:47 Order name: Hinojosa; Complete Time: 08:46                                                 [facility] 

                                                                                             

07:47 Order name: Urine Dipstick-Ancillary (obtain specimen); Complete Time: 08:47             

                                                                                                  

Administered Medications:                                                                         

08:10 Drug: Zithromax 1 grams Route: PO;                                                      em  

09:41 Follow up: Response: No adverse reaction                                                em  

08:10 Drug: Doxycycline 100 mg Route: PO;                                                     em  

09:42 Follow up: Response: No adverse reaction                                                em  

08:18 Drug: Rocephin - (cefTRIAXone) 1 grams Route: IVPB; Infused Over: 30 mins; Site: right  ss  

      antecubital;                                                                                

08:20 Follow up: Response: No adverse reaction; IV Status: Completed infusion; IV Intake: 10mlem  

08:44 Not Given (Physician Discretion): Ativan 1 mg IVP once                                  jr8 

08:55 Drug: fentaNYL (PF) 50 mcg Route: IVP; Site: right antecubital;                         ss  

09:15 Follow up: Response: No adverse reaction                                                em  

08:55 Drug: Valium 5 mg Route: IVP; Site: right antecubital;                                  ss  

09:43 Follow up: Response: No adverse reaction; Pain is decreased                             em  

10:00 Drug: Oxybutynin 5 mg Route: PO;                                                        em  

10:10 Follow up: Response: Medication administered at discharge.                              em  

                                                                                                  

                                                                                                  

Disposition:                                                                                      

18 09:09 Discharged to Home. Impression: Retention of urine, Acute prostatitis.             

- Condition is Stable.                                                                            

- Discharge Instructions: Hinojosa Catheter Care, Adult, Prostatitis, Acute Urinary                  

  Retention, Male.                                                                                

- Prescriptions for Doxycycline Monohydrate 100 mg Oral Tablet - take 1 tablet by ORAL            

  route every 12 hours for 14 days; 28 tablet. Tylenol- Codeine #3 300-30 mg Oral                 

  Tablet - take 2 tablets by ORAL route every 6 hours As needed; 12 tablet.                       

- Medication Reconciliation Form, Thank You Letter, Antibiotic Education, Prescription            

  Opioid Use form.                                                                                

- Follow up: Saundra Mitchell MD; When: 1 week; Reason: Recheck today's complaints,               

  Continuance of care, Re-evaluation by your physician.                                           

- Problem is new.                                                                                 

- Symptoms have improved.                                                                         

                                                                                                  

                                                                                                  

                                                                                                  

Addendum:                                                                                         

2019                                                                                        

     11:22 Co-signature as Attending Physician, Jose Corado MD I agree with the assessment and  c
ha

           plan of care.                                                                          

                                                                                                  

Signatures:                                                                                       

Dispatcher [employer]                           Jose Boland MD MD cha Munoz, Edgar, LVN                       LVN  em                                                   

Nabila Chavira RN RN ss Roszak, Josh, PA PA   jr8                                                  

                                                                                                  

Corrections: (The following items were deleted from the chart)                                    

                                                                                             

10:11 09:09 2018 09:09 Discharged to Home. Impression: Retention of urine; Acute        em  

      prostatitis. Condition is Stable. Forms are Medication Reconciliation Form, Thank You       

      Letter, Antibiotic Education, Prescription Opioid Use. Follow up: Saundra Mitchell; When:     

      1 week; Reason: Recheck today's complaints, Continuance of care, Re-evaluation by your      

      physician. Problem is new. Symptoms have improved. jr8                                      

                                                                                                  

**************************************************************************************************

## 2018-12-24 NOTE — XMS REPORT
Clinical Summary

 Created on:2018



Patient:Eddie Lopez

Sex:Male

:1995

External Reference #:[id number]





Demographics







 Address  [facility] [facility] [address]



   [place] [address]

 

 Home Phone  [phone]

 

 Preferred Language  English

 

 Marital Status  Unknown

 

 Confucianist Affiliation  Unknown

 

 Race  White

 

 Ethnic Group  Not  or 









Author







 Organization  [facility]

 

 Address  [address] [place] [address]









Support







 Name  Relationship  Address  Phone

 

 Riya Lopez  Unavailable  Unavailable  [phone]









Care Team Providers







 Name  Role  Phone

 

 Unavailable  Primary Care Provider  Unavailable









Allergies







 Active Allergy  Reactions  Severity  Noted Date  Comments

 

 Sulfa (Sulfonamide Antibiotics)  Hives    2018  







Medications







 Medication  Sig  Dispensed  Refills  Start Date  End Date  Status

 

 HYDROcodone-acetaminop  Take 1 tablet  10 tablet  0  2018  




 hen (NORCO 5-325)  by mouth every          



 5-325 mg per tablet  4 (four) hours          



   as needed for          



   Pain for up to          



   10 days. Max          



   Daily Amount: 6          



   tablets          

 

 amoxicillin-clavulanat  Take 1 tablet  14 tablet  0  2018  




 e (AUGMENTIN) 875-125  by mouth 2          



 mg per tablet  (two) times          



   daily for 7          



   days.          

 

 mupirocin (BACTROBAN)  by Nasal route  1 g  0  2018  



 2 % nasal ointment  2 (two) times          



   daily for 5          



   days Use          



   one-half of          



   tube in each          



   nostril twice          



   daily for 5          



   days.          

 

 Lactobacillus  Take 1 tablet    0  2018  



 acidoph-L.bulgar  by mouth 2          



 (FLORANEX) 1 million  (two) times          



 cell Tab per tablet  daily for 7          



   days.          

 

 ALPRAZolam (XANAX) 1  Take 1 tablet  14 tablet  0  2018  




 MG tablet  (1 mg total) by          



   mouth 2 (two)          



   times daily as          



   needed for          



   Anxiety for up          



   to 7 days. Max          



   Daily Amount: 2          



   mg          







Active Problems







 Problem  Noted Date

 

 Cellulitis  2018







Encounters







 Date  Type  Specialty  Care Team  Description

 

 2018  Travel      

 

 2018 -  Hospital  General Internal  Gucci Moreno  Cellulitis of face;



 2018  Encounter  Medicine  MD Rajesh



  Polysubstance abuse (HCC)



       Brann, MD Delfino Menendez Kimberly Ann, MD  



after 2017



Social History







 Tobacco Use  Types  Packs/Day  Years Used  Date

 

 Current Some Day Smoker  Cigarettes    5  









 Smokeless Tobacco: Never Used      









 Tobacco Cessation: Ready to Quit: No; Counseling Given: No



 Comments: pt. states smoke 5 cigarettes per day









 Alcohol Use  Drinks/Week  oz/Week  Comments

 

 Yes  2 Cans of beer



  1.8  



   1 Shots of liquor    









 Alcohol Habits  Answer  Date Recorded

 

 How often do you have a drink containing alcohol?  Not asked  

 

 How many drinks containing alcohol do you have on a typical  Not asked  



 day when you are drinking?    

 

 How often do you have six or more drinks on one occasion?  Never  2018









 Sex Assigned at Birth  Date Recorded

 

 Not on file  









 Job Start Date  Occupation  Industry

 

 Not on file  Not on file  Not on file









 Travel History  Travel Start  Travel End









 No recent travel history available.







Last Filed Vital Signs







 Vital Sign  Reading  Time Taken

 

 Blood Pressure  141/108  2018  8:29 AM CST

 

 Pulse  122  2018  8:29 AM CST

 

 Temperature  36.4 C (97.6 F)  2018  8:29 AM CST

 

 Respiratory Rate  18  2018  8:29 AM CST

 

 Oxygen Saturation  95%  2018  8:29 AM CST

 

 Inhaled Oxygen Concentration  -  -

 

 Weight  99.8 kg (220 lb)  2018  8:26 PM CST

 

 Height  188 cm (6' 2")  2018  8:26 PM CST

 

 Body Mass Index  28.25  2018  8:26 PM CST







Plan of Treatment

Not on file



Procedures







 Procedure Name  Priority  Date/Time  Associated  Comments



       Diagnosis  

 

 RAPID DRUG SCREEN,  Routine  2018  4:38    Results for this



 URINE    AM CST    procedure are in



         the results



         section.

 

 CBC W/PLT COUNT & AUTO  Routine  2018 11:03    Results for this



 DIFFERENTIAL    PM CST    procedure are in



         the results



         section.

 

 C-REACTIVE PROTEIN  Routine  2018 11:03    Results for this



     PM CST    procedure are in



         the results



         section.

 

 COMPREHENSIVE  Routine  2018 11:03    Results for this



 METABOLIC PANEL    PM CST    procedure are in



         the results



         section.

 

 CBC W/PLT COUNT & AUTO  Routine  2018 11:03    Results for this



 DIFFERENTIAL    PM CST    procedure are in



         the results



         section.

 

 BLOOD CULTURE  Routine  2018 11:03    Results for this



     PM CST    procedure are in



         the results



         section.

 

 BLOOD CULTURE  Routine  2018 11:03    Results for this



     PM CST    procedure are in



         the results



         section.

 

 WOUND CULTURE + GRAM  Routine  2018 10:34    Results for this



 STAIN    PM CST    procedure are in



         the results



         section.



after 2017



Results

Rapid drug screen, urine (2018  4:38 AM CST)





 Component  Value  Ref Range  Performed At

 

 Barbiturate Screen  Negative  Negative  [facility]

 

 Benzodiazepine Screen  Negative  Negative  [facility]

 

 Cocaine (Metab.) Screen  Negative  Negative  [facility]

 

 Methadone Screen  Negative  Negative  [facility]

 

 Opiate Screen  Positive (A)  Negative  [facility]

 

 Cannabinoid Screen  Positive (A)  Negative  [facility]

 

 Amph/Methamph Screen  Positive (A)  Negative  [facility]

 

 Phencyclidine Screen  Negative  Negative  [facility]

 

 Oxycodone Screen  Negative  Negative  [facility]









 Specimen

 

 Urine - Urine, Voided









 Narrative  Performed At

 

 DRUGCUTOFF  [facility]



 CONC.



  



 Cocaine 300 ng/mL  



 



  



 Bobuqtrmeir36 ng/mL 



  



 Xnrzevmkbxiysg891 ng/mL



  



 Barbiturate 200 ng/mL



  



 Apiyrsshpfpci44 ng/mL



  



 Onrlcd340  



 ng/mL



  



 Methadone 300 ng/mL



  



 Amphetamine/ 1000 ng/mL



  



 Methamphetamine



  



 Oxycodone 300 ng/mL



  



  



  



 This assay provides an unconfirmed  



 qualitative test result for the clinical  



 management of patients in emergency  



 situations. Chain of custody not maintained.  



 Some over-the-counter medications, as well as  



 adulterants, may cause inaccurate results.  



 Clinical correlation should be applied. A  



 more comprehensive drug screen or  



 confirmation of a detected drug may be  



 performed upon request.  









 Performing Organization  Address  City/[facility]/Zipcode  Phone Number

 

 [address] [id number] [phone]
[phone]



 CENTER      



C-Reactive Protein (2018 11:03 PM CST)





 Component  Value  Ref Range  Performed At

 

 CRP  9.68 (H)  0.00 - 0.50 mg/dL  [facility]









 Specimen

 

 Blood









 Performing Organization  Address  City/[facility]/Zipcode  Phone Number

 

 [address] [place] [address]  [phone]
[phone]



 CENTER      



CBC with platelet count + automated diff (2018 11:03 PM CST)





 Component  Value  Ref Range  Performed At

 

 WBC  13.4 (H)  3.5 - 10.5 K/L  [facility]

 

 RBC  5.36  4.63 - 6.08 M/L  [facility]

 

 Hemoglobin  15.9  13.7 - 17.5 GM/DL  [facility]

 

 Hematocrit  47.6  40.1 - 51.0 %  [facility]

 

 MCV  88.8  79.0 - 92.2 fL  [facility]

 

 MCH  29.7  25.7 - 32.2 pg  [facility]

 

 MCHC  33.4  32.3 - 36.5 GM/DL  [facility]

 

 RDW  11.9  11.6 - 14.4 %  [facility]

 

 Platelets  291  150 - 450 K/CU MM  [facility]

 

 MPV  10.6  9.4 - 12.4 fL  [facility]

 

 nRBC  0  0 - 0 /100 WBC  [facility]

 

 % Neutros  93  %  [facility]

 

 % Lymphs  4  %  [facility]

 

 % Monos  2  %  [facility]

 

 % Eos  0  %  [facility]

 

 % Baso  0  %  [facility]

 

 # Neutros  12.49 (H)  1.78 - 5.38 K/L  [facility]

 

 # Lymphs  0.52 (L)  1.32 - 3.57 K/L  [facility]

 

 # Monos  0.32  0.30 - 0.82 K/L  [facility]

 

 # Eos  0.00 (L)  0.04 - 0.54 K/L  [facility]

 

 # Baso  0.02  0.01 - 0.08 K/L  [facility]

 

 Immature  0  0 - 1 %  [facility]



 Granulocytes-[facility]      MEDICAL [place]









 Specimen

 

 Blood









 Performing Organization  Address  City/[facility]/Zipcode  Phone Number

 

 [id number] [place] [address] [phone]
[phone]



 [place]      



Blood culture (2018 11:03 PM CST)Only the most recent of2 resultswithin 
the time period is included.





 Component  Value  Ref Range  Performed At

 

 Result  No growth in 5 days    [facility]









 Specimen

 

 Blood - Arm, Right









 Performing Organization  Address  City/[facility]/Zipcode  Phone Number

 

 [address] [address] [phone]
[phone]



 [place]      



Comprehensive metabolic panel (2018 11:03 PM CST)





 Component  Value  Ref Range  Performed At

 

 Protein, Total  7.9  6.0 - 8.3 gm/dL  [facility]

 

 Albumin  4.6  3.5 - 5.0 g/dL  [facility]

 

 Alkaline Phosphatase  70  40 - 150 U/L  [facility]

 

 Total Bilirubin  1.6 (H)  0.2 - 1.2 mg/dL  [facility]

 

 Sodium  133 (L)  136 - 145 meq/L  [facility]

 

 Potassium  4.4  3.5 - 5.1 meq/L  [facility]

 

 Chloride  98  98 - 107 meq/L  [facility]

 

 CO2  22  22 - 29 meq/L  [facility]

 

 BUN  12  7 - 21 mg/dL  [facility]

 

 Creatinine  0.92  0.57 - 1.25 mg/dL  [facility]

 

 Glucose  159 (H)  70 - 105 mg/dL  [facility]

 

 Calcium  9.9  8.4 - 10.2 mg/dL  [facility]

 

 AST  19  5 - 34 U/L  [facility]

 

 ALT  16  6 - 55 U/L  [facility]

 

 EGFR  102Comment: ESTIMATED  mL/min/1.73 sq m  [facility]



   GFR IS NOT AS ACCURATE    [facility]



   AS CREATININE CLEARANCE    



   IN PREDICTING GLOMERULAR    



   FILTRATION RATE.    



   ESTIMATED GFR IS NOT    



   APPLICABLE FOR DIALYSIS    



   PATIENTS.    









 Specimen

 

 Blood









 Performing Organization  Address  City[facility]  Phone Number

 

 [address] [id number] [phone]
[phone]



 [place]      



Wound culture + gram stain (2018 10:34 PM CST)





 Component  Value  Ref Range  Performed At

 

 Result  METHICILLIN RESISTANT    [facility]



   STAPHYLOCOCCUS AUREUS (A)    [facility]

 

 Gram Stain Result  1+ White blood cells seen    [facility]

 

 Gram Stain Result  1+ gram negative rods    [facility]

 

 Gram Stain Result  2+ gram positive cocci in    [facility]



   chains and pairs    [facility]









 Specimen

 

 Abscess - Oral Mucosa/Gingiva









 Narrative  Performed At

 

 4+ normal dav present  [facility]









 Organism  Antibiotic  Method  Susceptibility

 

 Methicillin resistant  Clindamycin    0.25: Susceptible



 Staphylococcus aureus      

 

 Methicillin resistant  Erythromycin    0.5: Susceptible



 Staphylococcus aureus      

 

 Methicillin resistant  Linezolid    2: Susceptible



 Staphylococcus aureus      

 

 Methicillin resistant  Oxacillin    >=4: Resistant



 Staphylococcus aureus      

 

 Methicillin resistant  Rifampin    <=0.5: Susceptible



 Staphylococcus aureus      

 

 Methicillin resistant  Tetracycline    <=1: Susceptible



 Staphylococcus aureus      

 

 Methicillin resistant  Trimethoprim +    <=10: Susceptible



 Staphylococcus aureus  Sulfamethoxazole    

 

 Methicillin resistant  Vancomycin    <=0.5: Susceptible



 Staphylococcus aureus      









 Performing Organization  Address  City[facility]  Phone Number

 

 [address] [address]  [phone]
[phone]



 [place]      



after 2017



Advance Directives

For more information, please contact:[address] [phone]





 Code Status  Date Activated  Date Inactivated  Comments

 

 Full Code  2018  9:14 PM    









 This code status was determined by:  Patient

## 2018-12-24 NOTE — ER
Nurse's Notes                                                                                     

 [facility]                                                                

Name: Eddie Lopez                                                                                

Age: 23 yrs                                                                                       

Sex: Male                                                                                         

: 1995                                                                                   

MRN: [id number]                                                                                   

Arrival Date: 2018                                                                          

Time: 07:45                                                                                       

Account#: [id number]                                                                            

Bed 19                                                                                            

Private MD:                                                                                       

Diagnosis: Retention of urine;Acute prostatitis                                                   

                                                                                                  

Presentation:                                                                                     

                                                                                             

07:45 Presenting complaint: Patient states: "I've been unable to pee since yesterday." Pt     ss  

      also c/o suprapubic pressure and feeling the need to void. Denies fever. Transition of      

      care: patient was not received from another setting of care. Onset of symptoms was          

      2018. Risk Assessment: Do you want to hurt yourself or someone else?           

      Patient reports no desire to harm self or others. Initial Sepsis Screen: Does the           

      patient have a suspected source of infection? Yes: Dysuria/Frequency/Urgency/UTI. Note      

      Pt has wrist shackles and ankle shackles in place by [employer]. CMS intact. Care          

      prior to arrival: None.                                                                     

07:45 Method Of Arrival: Law Enforcement: [employer]                                         ss  

07:45 Acuity: FAMILIA 3                                                                           ss  

                                                                                                  

Historical:                                                                                       

- Allergies:                                                                                      

07:48 Sulfa (Sulfonamide Antibiotics);                                                        ss  

- Home Meds:                                                                                      

07:48 None [Active];                                                                          ss  

- PMHx:                                                                                           

07:48 Schizophrenia; Bipolar disorder; Anxiety;                                               ss  

- PSHx:                                                                                           

07:48 None;                                                                                   ss  

                                                                                                  

- Immunization history:: Adult Immunizations up to date.                                          

- Social history:: Smoking status: Patient/guardian denies using tobacco.                         

- Ebola Screening: : Patient denies exposure to infectious person Patient denies travel           

  to an Ebola-affected area in the 21 days before illness onset.                                  

                                                                                                  

                                                                                                  

Screenin:01 Abuse screen: Denies threats or abuse. Denies injuries from another. Nutritional        ss  

      screening: No deficits noted. Tuberculosis screening: No symptoms or risk factors           

      identified. Never had TB. Fall Risk None identified.                                        

                                                                                                  

Assessment:                                                                                       

08:01 General: Appears uncomfortable, Behavior is calm, cooperative, Denies fever, feeling    ss  

      ill, fatigue, chills. Pain: Complains of pain in suprapubic area Pain currently is 5        

      out of 10 on a pain scale. Quality of pain is described as pressure, Pain began "this       

      morning" Is continuous. Neuro: Level of Consciousness is awake, alert, obeys commands,      

      Oriented to person, place, time, situation, Speech is normal, Facial symmetry appears       

      normal, Pupils are PERRLA. Cardiovascular: Capillary refill < 3 seconds is brisk in         

      bilateral fingers Patient's skin is warm and dry. Respiratory: Airway is patent             

      Respiratory effort is even, unlabored, Respiratory pattern is regular, symmetrical,         

      Denies cough. GI: Abdomen is non-distended, Bowel sounds present X 4 quads. Patient         

      currently denies diarrhea, nausea, vomiting. : Reports burning with urination, began      

      yesterday as a mild discomfort/ burning pain in suprapubic area "I've had unprotected       

      sex and want to get checked for STDs" Denies discharge. EENT: Nares are clear Oral          

      mucosa is moist. Derm: Skin is intact, is healthy with good turgor, Skin is dry, Skin       

      is pink, warm \T\ dry. normal. Musculoskeletal: Circulation, motion, and sensation          

      intact. Range of motion: intact in all extremities, Swelling absent.                        

09:03 Reassessment: Patient appears in no apparent distress at this time. Patient and/or      em  

      family updated on plan of care and expected duration. Pain level reassessed. Patient is     

      alert, oriented x 3, equal unlabored respirations, skin warm/dry/pink. Patient states       

      feeling better.                                                                             

10:00 Reassessment: placed leg bag on pt, attached to right side.                             em  

                                                                                                  

Vital Signs:                                                                                      

07:52  / 65; Pulse 74; Resp 15; Temp 98.0; Pulse Ox 100% on R/A; Weight 97.52 kg;       ss  

      Height 6 ft. 2 in. (187.96 cm); Pain 5/10;                                                  

07:52 Body Mass Index 27.60 (97.52 kg, 187.96 cm)                                               

                                                                                                  

ED Course:                                                                                        

07:45 Patient arrived in ED.                                                                  ss  

07:46 Justo Santo PA is PHCP.                                                               jr8 

07:46 Jose Corado MD is Attending Physician.                                             [id number] 

07:48 Triage completed.                                                                       ss  

07:48 Arm band placed on right wrist.                                                         ss  

07:56 Paxton Beebe LVN is Primary Nurse.                                                     em  

08:01 Patient has correct armband on for positive identification. Bed in low position. Call     

      light in reach.                                                                             

08:10 Initial lab(s) drawn, by me, sent to lab. Inserted saline lock: 20 gauge in right       em  

      antecubital area, using aseptic technique. Blood collected.                                 

08:30 Hinojosa cath inserted, using sterile technique, 18 Fr., by me, balloon inflated, to       em  

      gravity drainage, urine specimen collected. returned clear yellow urine. Patient            

      tolerated well.                                                                             

08:30 Urine collected: Hinojosa catheter specimen, clear, Amount Returned: 700mL.                em  

09:08 Saundra Mitchell MD is Referral Physician.                                              [email or web address] 

09:47 IV discontinued, intact, bleeding controlled, No redness/swelling at site. Pressure     em  

      dressing applied.                                                                           

10:11 No provider procedures requiring assistance completed.                                  em  

                                                                                                  

Administered Medications:                                                                         

08:10 Drug: Zithromax 1 grams Route: PO;                                                      em  

09:41 Follow up: Response: No adverse reaction                                                em  

08:10 Drug: Doxycycline 100 mg Route: PO;                                                     em  

09:42 Follow up: Response: No adverse reaction                                                em  

08:18 Drug: Rocephin - (cefTRIAXone) 1 grams Route: IVPB; Infused Over: 30 mins; Site: right  ss  

      antecubital;                                                                                

08:20 Follow up: Response: No adverse reaction; IV Status: Completed infusion; IV Intake: 10mlem  

08:44 Not Given (Physician Discretion): Ativan 1 mg IVP once                                  jr8 

08:55 Drug: fentaNYL (PF) 50 mcg Route: IVP; Site: right antecubital;                         ss  

09:15 Follow up: Response: No adverse reaction                                                em  

08:55 Drug: Valium 5 mg Route: IVP; Site: right antecubital;                                  ss  

09:43 Follow up: Response: No adverse reaction; Pain is decreased                             em  

10:00 Drug: Oxybutynin 5 mg Route: PO;                                                        em  

10:10 Follow up: Response: Medication administered at discharge.                              em  

                                                                                                  

                                                                                                  

Intake:                                                                                           

08:20 IV: 10ml; Total: 10ml.                                                                  em  

                                                                                                  

Outcome:                                                                                          

09:09 Discharge ordered by MD.                                                                [id number] 

10:11 Discharged to Law Enforcement                                                           em  

10:11 Condition: good                                                                             

10:11 Discharge instructions given to patient, police, Instructed on discharge instructions,      

      follow up and referral plans. medication usage, Demonstrated understanding of               

      instructions, follow-up care, medications, Prescriptions given X 2.                         

10:11 Patient left the ED.                                                                    em  

                                                                                                  

Signatures:                                                                                       

Paxton Beebe, SUNDAYN                       LVN  em                                                   

Nabila Chavira RN                      RN   Justo Washington PA PA   jr8                                                  

                                                                                                  

**************************************************************************************************

## 2018-12-24 NOTE — XMS REPORT
Patient Summary Document

 Created on:2018



Patient:CONOR FRAIRE

Sex:Male

:1995

External Reference #:[id number]





Demographics







 Address  [address] [place] [address]

 

 Home Phone  [phone]

 

 Email Address  [email or web address]

 

 Preferred Language  Unknown

 

 Marital Status  Unknown

 

 Confucianism Affiliation  Unknown

 

 Race  Unknown

 

 Additional Race(s)  Unavailable

 

 Ethnic Group  Unknown









Author







 Organization  [facility]

 

 Address  [address] [place] [address] [address]. L[address]in [address] [place] [id number]

 

 Phone  [phone]









Care Team Providers







 Name  Role  Phone

 

 HORTENCIA SOSA  Unavailable  Unavailable









Problems

This patient has no known problems.



Allergies, Adverse Reactions, Alerts

This patient has no known allergies or adverse reactions.



Medications

This patient has no known medications.



Results







 Test Description  Test Time  Test Comments  Text Results  Atomic Results  
Result Comments









 BLOOD CULTURE  2018 05:01:00    









   Test Item  Value  Reference Range  Comments









 CULTURE (BEAKER) (test code=1095)  No growth in 5 days    



BLOOD [id number] 05:01:00





 Test Item  Value  Reference Range  Comments

 

 CULTURE (BEAKER) (test code=1095)  No growth in 5 days    



WOUND CULTURE + GRAM [id number] 16:03:00





 Test Item  Value  Reference Range  Comments

 

 CULTURE (BEAKER) (test  METHICILLIN RESISTANT    4+ Methicillin



 code=1095)  STAPHYLOCOCCUS AUREUS    resistant



       Staphylococcus aureus

 

 Clindamycin (test      



 code=10)      

 

 Erythromycin (test      



 code=4)      

 

 Linezolid (test code=40)      

 

 Nitrofurantoin (test      



 code=23)      

 

 Oxacillin (test code=14)      

 

 Rifampin (test code=43)      

 

 Tetracycline (test      



 code=2)      

 

 Trimethoprim +      



 Sulfamethoxazole (test      



 code=47)      

 

 Vancomycin (test code=13)      

 

 GRAM STAIN RESULT  1+ White blood cells    



 (BEAKER) (test code=1123)  seen    

 

 GRAM STAIN RESULT  1+ gram negative rods    



 (BEAKER) (test      



 code=112319)      

 

 GRAM STAIN RESULT  2+ gram positive cocci    



 (BEAKER) (test  in chains and pairs    



 code=112320)      



4+ normal dav presentRAPID DRUG SCREEN, [id number] 05:52:00





 Test Item  Value  Reference Range  Comments

 

 BARBITURATE URINE (BEAKER) (test code=725)  Negative  Negative  

 

 BENZODIAZEPINE SCREEN URINE (BEAKER) (test  Negative  Negative  



 code=726)      

 

 COCAINE (METAB.) SCREEN (BEAKER) (test code=1164)  Negative  Negative  

 

 METHADONE SCREEN (BEAKER) (test code=1436)  Negative  Negative  

 

 OPIATE SCREEN URINE (BEAKER) (test code=734)  Positive  Negative  

 

 CANNABINOID SCREEN URINE (BEAKER) (test code=727)  Positive  Negative  

 

 AMPH/METHAMPH SCREEN (BEAKER) (test code=1438)  Positive  Negative  

 

 PHENCYCLIDINE SCREEN URINE (BEAKER) (test code=608)  Negative  Negative  

 

 OXYCODONE SCREEN URINE (BEAKER) (test code=2761)  Negative  Negative  



DRUG                CUTOFF CONC.Cocaine               300 ng/mL Cannabinoid    
        50 ng/mL Benzodiazepine        200 ng/mLBarbiturate           200 ng/
mLPhencyclidine          25 ng/mLOpiate         300 ng/mLMethadone             
300 ng/mLAmphetamine/         1000 ng/mL  MethamphetamineOxycodone             
300 ng/mLThis assay provides an unconfirmed qualitative test result for the 
clinical management of patients in emergency situations. Chain of custody not 
maintained. Some over-the-counter medications, as well as adulterants, may 
cause inaccurate results. Clinical correlation should be applied. A more 
comprehensive drug screen or confirmation of a detected drug may be performed 
upon request.CBC W/PLT COUNT &amp; AUTO [id number] 23:49:00





 Test Item  Value  Reference Range  Comments

 

 WHITE BLOOD CELL COUNT (BEAKER) (test code=775)  13.4 K/ L  3.5-10.5  

 

 RED BLOOD CELL COUNT (BEAKER) (test code=761)  5.36 M/ L  4.63-6.08  

 

 HEMOGLOBIN (BEAKER) (test code=410)  15.9 GM/DL  13.7-17.5  

 

 HEMATOCRIT (BEAKER) (test code=411)  47.6 %  40.1-51.0  

 

 MEAN CORPUSCULAR VOLUME (BEAKER) (test code=753)  88.8 fL  79.0-92.2  

 

 MEAN CORPUSCULAR HEMOGLOBIN (BEAKER) (test  29.7 pg  25.7-32.2  



 duvd=075)      

 

 MEAN CORPUSCULAR HEMOGLOBIN CONC (BEAKER) (test  33.4 GM/DL  32.3-36.5  



 code=752)      

 

 RED CELL DISTRIBUTION WIDTH (BEAKER) (test  11.9 %  11.6-14.4  



 code=412)      

 

 PLATELET COUNT (BEAKER) (test code=756)  291 K/CU MM  150-450  

 

 MEAN PLATELET VOLUME (BEAKER) (test code=754)  10.6 fL  9.4-12.4  

 

 NUCLEATED RED BLOOD CELLS (BEAKER) (test  0 /100 WBC  0-0  



 code=413)      

 

 NEUTROPHILS RELATIVE PERCENT (BEAKER) (test  93 %    



 code=429)      

 

 LYMPHOCYTES RELATIVE PERCENT (BEAKER) (test  4 %    



 code=430)      

 

 MONOCYTES RELATIVE PERCENT (BEAKER) (test  2 %    



 code=431)      

 

 EOSINOPHILS RELATIVE PERCENT (BEAKER) (test  0 %    



 code=432)      

 

 BASOPHILS RELATIVE PERCENT (BEAKER) (test  0 %    



 code=437)      

 

 NEUTROPHILS ABSOLUTE COUNT (BEAKER) (test  12.49 K/ L  1.78-5.38  



 code=670)      

 

 LYMPHOCYTES ABSOLUTE COUNT (BEAKER) (test  0.52 K/ L  1.32-3.57  



 code=414)      

 

 MONOCYTES ABSOLUTE COUNT (BEAKER) (test  0.32 K/ L  0.30-0.82  



 code=415)      

 

 EOSINOPHILS ABSOLUTE COUNT (BEAKER) (test  0.00 K/ L  0.04-0.54  



 code=416)      

 

 BASOPHILS ABSOLUTE COUNT (BEAKER) (test  0.02 K/ L  0.01-0.08  



 code=417)      

 

 IMMATURE GRANULOCYTES-RELATIVE PERCENT (BEAKER)  0 %  0-1  



 (test code=2805)      



COMPREHENSIVE METABOLIC [id number] 23:48:00





 Test Item  Value  Reference Range  Comments

 

 TOTAL PROTEIN (BEAKER)  7.9 gm/dL  6.0-8.3  



 (test code=770)      

 

 ALBUMIN (BEAKER) (test  4.6 g/dL  3.5-5.0  



 code=1145)      

 

 ALKALINE PHOSPHATASE  70 U/L    



 (BEAKER) (test code=346)      

 

 BILIRUBIN TOTAL (BEAKER)  1.6 mg/dL  0.2-1.2  



 (test code=377)      

 

 SODIUM (BEAKER) (test  133 meq/L  136-145  



 pavo=873)      

 

 POTASSIUM (BEAKER) (test  4.4 meq/L  3.5-5.1  



 code=379)      

 

 CHLORIDE (BEAKER) (test  98 meq/L    



 code=382)      

 

 CO2 (BEAKER) (test  22 meq/L  22-29  



 code=355)      

 

 BLOOD UREA NITROGEN  12 mg/dL  7-21  



 (BEAKER) (test code=354)      

 

 CREATININE (BEAKER) (test  0.92 mg/dL  0.57-1.25  



 code=358)      

 

 GLUCOSE RANDOM (BEAKER)  159 mg/dL    



 (test code=652)      

 

 CALCIUM (BEAKER) (test  9.9 mg/dL  8.4-10.2  



 code=697)      

 

 AST (SGOT) (BEAKER) (test  19 U/L  5-34  



 code=353)      

 

 ALT (SGPT) (BEAKER) (test  16 U/L  6-55  



 code=347)      

 

 EGFR (BEAKER) (test  102 mL/min/1.73 sq    ESTIMATED GFR IS NOT AS



 code=1092)  m    ACCURATE AS CREATININE



       CLEARANCE IN PREDICTING



       GLOMERULAR FILTRATION



       RATE. ESTIMATED GFR IS



       NOT APPLICABLE FOR



       DIALYSIS PATIENTS.



C-REACTIVE [id number] 23:48:00





 Test Item  Value  Reference Range  Comments

 

 C-REACTIVE PROTEIN (LAMAKER) (test code=676)  9.68 mg/dL  0.00-0.50

## 2018-12-27 ENCOUNTER — HOSPITAL ENCOUNTER (EMERGENCY)
Dept: HOSPITAL 97 - ER | Age: 23
LOS: 1 days | Discharge: HOME | End: 2018-12-28
Payer: SELF-PAY

## 2018-12-27 DIAGNOSIS — F17.210: ICD-10-CM

## 2018-12-27 DIAGNOSIS — N41.0: Primary | ICD-10-CM

## 2018-12-27 PROCEDURE — 96375 TX/PRO/DX INJ NEW DRUG ADDON: CPT

## 2018-12-27 PROCEDURE — 96361 HYDRATE IV INFUSION ADD-ON: CPT

## 2018-12-27 PROCEDURE — 36415 COLL VENOUS BLD VENIPUNCTURE: CPT

## 2018-12-27 PROCEDURE — 81015 MICROSCOPIC EXAM OF URINE: CPT

## 2018-12-27 PROCEDURE — 85025 COMPLETE CBC W/AUTO DIFF WBC: CPT

## 2018-12-27 PROCEDURE — 80053 COMPREHEN METABOLIC PANEL: CPT

## 2018-12-27 PROCEDURE — 96365 THER/PROPH/DIAG IV INF INIT: CPT

## 2018-12-27 PROCEDURE — 81003 URINALYSIS AUTO W/O SCOPE: CPT

## 2018-12-27 PROCEDURE — 99284 EMERGENCY DEPT VISIT MOD MDM: CPT

## 2018-12-27 NOTE — XMS REPORT
Clinical Summary

 Created on:2018



Patient:Eddie Lopez

Sex:Male

:1995

External Reference #:[id number]





Demographics







 Address  [facility] [place] [address]



   [place] [address]

 

 Home Phone  [phone]

 

 Preferred Language  English

 

 Marital Status  Unknown

 

 Sikh Affiliation  Unknown

 

 Race  White

 

 Ethnic Group  Not  or 









Author







 Organization  [facility]

 

 Address  [address] [place] [address]









Support







 Name  Relationship  Address  Phone

 

 Riya Lopez  Unavailable  Unavailable  [phone]









Care Team Providers







 Name  Role  Phone

 

 Unavailable  Primary Care Provider  Unavailable









Allergies







 Active Allergy  Reactions  Severity  Noted Date  Comments

 

 Sulfa (Sulfonamide Antibiotics)  Hives    2018  







Medications







 Medication  Sig  Dispensed  Refills  Start Date  End Date  Status

 

 HYDROcodone-acetaminop  Take 1 tablet  10 tablet  0  2018  




 hen (NORCO 5-325)  by mouth every          



 5-325 mg per tablet  4 (four) hours          



   as needed for          



   Pain for up to          



   10 days. Max          



   Daily Amount: 6          



   tablets          

 

 amoxicillin-clavulanat  Take 1 tablet  14 tablet  0  2018  




 e (AUGMENTIN) 875-125  by mouth 2          



 mg per tablet  (two) times          



   daily for 7          



   days.          

 

 mupirocin (BACTROBAN)  by Nasal route  1 g  0  2018  



 2 % nasal ointment  2 (two) times          



   daily for 5          



   days Use          



   one-half of          



   tube in each          



   nostril twice          



   daily for 5          



   days.          

 

 Lactobacillus  Take 1 tablet    0  2018  



 acidoph-L.bulgar  by mouth 2          



 (FLORANEX) 1 million  (two) times          



 cell Tab per tablet  daily for 7          



   days.          

 

 ALPRAZolam (XANAX) 1  Take 1 tablet  14 tablet  0  2018  




 MG tablet  (1 mg total) by          



   mouth 2 (two)          



   times daily as          



   needed for          



   Anxiety for up          



   to 7 days. Max          



   Daily Amount: 2          



   mg          







Active Problems







 Problem  Noted Date

 

 Cellulitis  2018







Encounters







 Date  Type  Specialty  Care Team  Description

 

 2018  Travel      

 

 2018 -  Hospital  General Internal  Gucci Moreno  Cellulitis of face;



 2018  Encounter  Medicine  MD Rajesh



  Polysubstance abuse (HCC)



       Brann, MD Delfino Menendez Kimberly Ann, MD  



after 2017



Social History







 Tobacco Use  Types  Packs/Day  Years Used  Date

 

 Current Some Day Smoker  Cigarettes    5  









 Smokeless Tobacco: Never Used      









 Tobacco Cessation: Ready to Quit: No; Counseling Given: No



 Comments: pt. states smoke 5 cigarettes per day









 Alcohol Use  Drinks/Week  oz/Week  Comments

 

 Yes  2 Cans of beer



  1.8  



   1 Shots of liquor    









 Alcohol Habits  Answer  Date Recorded

 

 How often do you have a drink containing alcohol?  Not asked  

 

 How many drinks containing alcohol do you have on a typical  Not asked  



 day when you are drinking?    

 

 How often do you have six or more drinks on one occasion?  Never  2018









 Sex Assigned at Birth  Date Recorded

 

 Not on file  









 Job Start Date  Occupation  Industry

 

 Not on file  Not on file  Not on file









 Travel History  Travel Start  Travel End









 No recent travel history available.







Last Filed Vital Signs







 Vital Sign  Reading  Time Taken

 

 Blood Pressure  141/108  2018  8:29 AM CST

 

 Pulse  122  2018  8:29 AM CST

 

 Temperature  36.4 C (97.6 F)  2018  8:29 AM CST

 

 Respiratory Rate  18  2018  8:29 AM CST

 

 Oxygen Saturation  95%  2018  8:29 AM CST

 

 Inhaled Oxygen Concentration  -  -

 

 Weight  99.8 kg (220 lb)  2018  8:26 PM CST

 

 Height  188 cm (6' 2")  2018  8:26 PM CST

 

 Body Mass Index  28.25  2018  8:26 PM CST







Plan of Treatment

Not on file



Procedures







 Procedure Name  Priority  Date/Time  Associated  Comments



       Diagnosis  

 

 RAPID DRUG SCREEN,  Routine  2018  4:38    Results for this



 URINE    AM CST    procedure are in



         the results



         section.

 

 CBC W/PLT COUNT & AUTO  Routine  2018 11:03    Results for this



 DIFFERENTIAL    PM CST    procedure are in



         the results



         section.

 

 C-REACTIVE PROTEIN  Routine  2018 11:03    Results for this



     PM CST    procedure are in



         the results



         section.

 

 COMPREHENSIVE  Routine  2018 11:03    Results for this



 METABOLIC PANEL    PM CST    procedure are in



         the results



         section.

 

 CBC W/PLT COUNT & AUTO  Routine  2018 11:03    Results for this



 DIFFERENTIAL    PM CST    procedure are in



         the results



         section.

 

 BLOOD CULTURE  Routine  2018 11:03    Results for this



     PM CST    procedure are in



         the results



         section.

 

 BLOOD CULTURE  Routine  2018 11:03    Results for this



     PM CST    procedure are in



         the results



         section.

 

 WOUND CULTURE + GRAM  Routine  2018 10:34    Results for this



 STAIN    PM CST    procedure are in



         the results



         section.



after 2017



Results

Rapid drug screen, urine (2018  4:38 AM CST)





 Component  Value  Ref Range  Performed At

 

 Barbiturate Screen  Negative  Negative  [facility]

 

 Benzodiazepine Screen  Negative  Negative  [facility]

 

 Cocaine (Metab.) Screen  Negative  Negative  [facility]

 

 Methadone Screen  Negative  Negative  [facility]

 

 Opiate Screen  Positive (A)  Negative  [facility]

 

 Cannabinoid Screen  Positive (A)  Negative  [facility]

 

 Amph/Methamph Screen  Positive (A)  Negative  [facility]

 

 Phencyclidine Screen  Negative  Negative  [facility]

 

 Oxycodone Screen  Negative  Negative  [facility]









 Specimen

 

 Urine - Urine, Voided









 Narrative  Performed At

 

 DRUGCUTOFF  [facility]



 CONC.



  



 Cocaine 300 ng/mL  



 



  



 Qpggpkdauxv46 ng/mL 



  



 Ffiithboytajqg317 ng/mL



  



 Barbiturate 200 ng/mL



  



 Nmpudoretmyuh71 ng/mL



  



 Hwgecs172  



 ng/mL



  



 Methadone 300 ng/mL



  



 Amphetamine/ 1000 ng/mL



  



 Methamphetamine



  



 Oxycodone 300 ng/mL



  



  



  



 This assay provides an unconfirmed  



 qualitative test result for the clinical  



 management of patients in emergency  



 situations. Chain of custody not maintained.  



 Some over-the-counter medications, as well as  



 adulterants, may cause inaccurate results.  



 Clinical correlation should be applied. A  



 more comprehensive drug screen or  



 confirmation of a detected drug may be  



 performed upon request.  









 Performing Organization  Address  City/[facility]/Zipcode  Phone Number

 

 [address] [id number] [phone]
[phone]



 CENTER      



C-Reactive Protein (2018 11:03 PM CST)





 Component  Value  Ref Range  Performed At

 

 CRP  9.68 (H)  0.00 - 0.50 mg/dL  [facility]









 Specimen

 

 Blood









 Performing Organization  Address  City/[facility]/Zipcode  Phone Number

 

 [address] [place] [address]  [phone]
[phone]



 CENTER      



CBC with platelet count + automated diff (2018 11:03 PM CST)





 Component  Value  Ref Range  Performed At

 

 WBC  13.4 (H)  3.5 - 10.5 K/L  [facility]

 

 RBC  5.36  4.63 - 6.08 M/L  [facility]

 

 Hemoglobin  15.9  13.7 - 17.5 GM/DL  [facility]

 

 Hematocrit  47.6  40.1 - 51.0 %  [facility]

 

 MCV  88.8  79.0 - 92.2 fL  [facility]

 

 MCH  29.7  25.7 - 32.2 pg  [facility]

 

 MCHC  33.4  32.3 - 36.5 GM/DL  [facility]

 

 RDW  11.9  11.6 - 14.4 %  [facility]

 

 Platelets  291  150 - 450 K/CU MM  [facility]

 

 MPV  10.6  9.4 - 12.4 fL  [facility]

 

 nRBC  0  0 - 0 /100 WBC  [facility]

 

 % Neutros  93  %  [facility]

 

 % Lymphs  4  %  [facility]

 

 % Monos  2  %  [facility]

 

 % Eos  0  %  [facility]

 

 % Baso  0  %  [facility]

 

 # Neutros  12.49 (H)  1.78 - 5.38 K/L  [facility]

 

 # Lymphs  0.52 (L)  1.32 - 3.57 K/L  [facility]

 

 # Monos  0.32  0.30 - 0.82 K/L  [facility]

 

 # Eos  0.00 (L)  0.04 - 0.54 K/L  [facility]

 

 # Baso  0.02  0.01 - 0.08 K/L  [facility]

 

 Immature  0  0 - 1 %  [facility]



 Granulocytes-[facility]      MEDICAL [place]









 Specimen

 

 Blood









 Performing Organization  Address  City/[facility]/Zipcode  Phone Number

 

 [id number] [place] [address] [phone]
[phone]



 [place]      



Blood culture (2018 11:03 PM CST)Only the most recent of2 resultswithin 
the time period is included.





 Component  Value  Ref Range  Performed At

 

 Result  No growth in 5 days    [facility]









 Specimen

 

 Blood - Arm, Right









 Performing Organization  Address  City/[facility]/Zipcode  Phone Number

 

 [address] [address] [phone]
[phone]



 [place]      



Comprehensive metabolic panel (2018 11:03 PM CST)





 Component  Value  Ref Range  Performed At

 

 Protein, Total  7.9  6.0 - 8.3 gm/dL  [facility]

 

 Albumin  4.6  3.5 - 5.0 g/dL  [facility]

 

 Alkaline Phosphatase  70  40 - 150 U/L  [facility]

 

 Total Bilirubin  1.6 (H)  0.2 - 1.2 mg/dL  [facility]

 

 Sodium  133 (L)  136 - 145 meq/L  [facility]

 

 Potassium  4.4  3.5 - 5.1 meq/L  [facility]

 

 Chloride  98  98 - 107 meq/L  [facility]

 

 CO2  22  22 - 29 meq/L  [facility]

 

 BUN  12  7 - 21 mg/dL  [facility]

 

 Creatinine  0.92  0.57 - 1.25 mg/dL  [facility]

 

 Glucose  159 (H)  70 - 105 mg/dL  [facility]

 

 Calcium  9.9  8.4 - 10.2 mg/dL  [facility]

 

 AST  19  5 - 34 U/L  [facility]

 

 ALT  16  6 - 55 U/L  [facility]

 

 EGFR  102Comment: ESTIMATED  mL/min/1.73 sq m  [facility]



   GFR IS NOT AS ACCURATE    [facility]



   AS CREATININE CLEARANCE    



   IN PREDICTING GLOMERULAR    



   FILTRATION RATE.    



   ESTIMATED GFR IS NOT    



   APPLICABLE FOR DIALYSIS    



   PATIENTS.    









 Specimen

 

 Blood









 Performing Organization  Address  City[facility]  Phone Number

 

 [address] [id number] [phone]
[phone]



 [place]      



Wound culture + gram stain (2018 10:34 PM CST)





 Component  Value  Ref Range  Performed At

 

 Result  METHICILLIN RESISTANT    [facility]



   STAPHYLOCOCCUS AUREUS (A)    [facility]

 

 Gram Stain Result  1+ White blood cells seen    [facility]

 

 Gram Stain Result  1+ gram negative rods    [facility]

 

 Gram Stain Result  2+ gram positive cocci in    [facility]



   chains and pairs    [facility]









 Specimen

 

 Abscess - Oral Mucosa/Gingiva









 Narrative  Performed At

 

 4+ normal dav present  [facility]









 Organism  Antibiotic  Method  Susceptibility

 

 Methicillin resistant  Clindamycin    0.25: Susceptible



 Staphylococcus aureus      

 

 Methicillin resistant  Erythromycin    0.5: Susceptible



 Staphylococcus aureus      

 

 Methicillin resistant  Linezolid    2: Susceptible



 Staphylococcus aureus      

 

 Methicillin resistant  Oxacillin    >=4: Resistant



 Staphylococcus aureus      

 

 Methicillin resistant  Rifampin    <=0.5: Susceptible



 Staphylococcus aureus      

 

 Methicillin resistant  Tetracycline    <=1: Susceptible



 Staphylococcus aureus      

 

 Methicillin resistant  Trimethoprim +    <=10: Susceptible



 Staphylococcus aureus  Sulfamethoxazole    

 

 Methicillin resistant  Vancomycin    <=0.5: Susceptible



 Staphylococcus aureus      









 Performing Organization  Address  City[facility]  Phone Number

 

 [address] [address]  [phone]
[phone]



 [place]      



after 2017



Advance Directives

For more information, please contact:[address] [phone]





 Code Status  Date Activated  Date Inactivated  Comments

 

 Full Code  2018  9:14 PM    









 This code status was determined by:  Patient

## 2018-12-27 NOTE — XMS REPORT
Patient Summary Document

 Created on:2018



Patient:CONOR FRAIRE

Sex:Male

:1995

External Reference #:[id number]





Demographics







 Address  [address] [place] [address]

 

 Home Phone  [phone]

 

 Email Address  [email or web address]

 

 Preferred Language  Unknown

 

 Marital Status  Unknown

 

 Judaism Affiliation  Unknown

 

 Race  Unknown

 

 Additional Race(s)  Unavailable

 

 Ethnic Group  Unknown









Author







 Organization  [facility]

 

 Address  [address] [place] [address] Dr. L[address]in [address] [place] [address]

 

 Phone  [phone]









Care Team Providers







 Name  Role  Phone

 

 HORTENCIA SOSA  Unavailable  Unavailable









Problems

This patient has no known problems.



Allergies, Adverse Reactions, Alerts

This patient has no known allergies or adverse reactions.



Medications

This patient has no known medications.



Results







 Test Description  Test Time  Test Comments  Text Results  Atomic Results  
Result Comments









 BLOOD CULTURE  2018 05:01:00    









   Test Item  Value  Reference Range  Comments









 CULTURE (BEAKER) (test code=1095)  No growth in 5 days    



BLOOD [id number] 05:01:00





 Test Item  Value  Reference Range  Comments

 

 CULTURE (BEAKER) (test code=1095)  No growth in 5 days    



WOUND CULTURE + GRAM [id number] 16:03:00





 Test Item  Value  Reference Range  Comments

 

 CULTURE (BEAKER) (test  METHICILLIN RESISTANT    4+ Methicillin



 code=1095)  STAPHYLOCOCCUS AUREUS    resistant



       Staphylococcus aureus

 

 Clindamycin (test      



 code=10)      

 

 Erythromycin (test      



 code=4)      

 

 Linezolid (test code=40)      

 

 Nitrofurantoin (test      



 code=23)      

 

 Oxacillin (test code=14)      

 

 Rifampin (test code=43)      

 

 Tetracycline (test      



 code=2)      

 

 Trimethoprim +      



 Sulfamethoxazole (test      



 code=47)      

 

 Vancomycin (test code=13)      

 

 GRAM STAIN RESULT  1+ White blood cells    



 (BEAKER) (test code=1123)  seen    

 

 GRAM STAIN RESULT  1+ gram negative rods    



 (BEAKER) (test      



 code=112319)      

 

 GRAM STAIN RESULT  2+ gram positive cocci    



 (BEAKER) (test  in chains and pairs    



 code=112320)      



4+ normal dav presentRAPID DRUG SCREEN, [id number] 05:52:00





 Test Item  Value  Reference Range  Comments

 

 BARBITURATE URINE (BEAKER) (test code=725)  Negative  Negative  

 

 BENZODIAZEPINE SCREEN URINE (BEAKER) (test  Negative  Negative  



 code=726)      

 

 COCAINE (METAB.) SCREEN (BEAKER) (test code=1164)  Negative  Negative  

 

 METHADONE SCREEN (BEAKER) (test code=1436)  Negative  Negative  

 

 OPIATE SCREEN URINE (BEAKER) (test code=734)  Positive  Negative  

 

 CANNABINOID SCREEN URINE (BEAKER) (test code=727)  Positive  Negative  

 

 AMPH/METHAMPH SCREEN (BEAKER) (test code=1438)  Positive  Negative  

 

 PHENCYCLIDINE SCREEN URINE (BEAKER) (test code=608)  Negative  Negative  

 

 OXYCODONE SCREEN URINE (BEAKER) (test code=2761)  Negative  Negative  



DRUG                CUTOFF CONC.Cocaine               300 ng/mL Cannabinoid    
        50 ng/mL Benzodiazepine        200 ng/mLBarbiturate           200 ng/
mLPhencyclidine          25 ng/mLOpiate         300 ng/mLMethadone             
300 ng/mLAmphetamine/         1000 ng/mL  MethamphetamineOxycodone             
300 ng/mLThis assay provides an unconfirmed qualitative test result for the 
clinical management of patients in emergency situations. Chain of custody not 
maintained. Some over-the-counter medications, as well as adulterants, may 
cause inaccurate results. Clinical correlation should be applied. A more 
comprehensive drug screen or confirmation of a detected drug may be performed 
upon request.CBC W/PLT COUNT &amp; AUTO [id number] 23:49:00





 Test Item  Value  Reference Range  Comments

 

 WHITE BLOOD CELL COUNT (BEAKER) (test code=775)  13.4 K/ L  3.5-10.5  

 

 RED BLOOD CELL COUNT (BEAKER) (test code=761)  5.36 M/ L  4.63-6.08  

 

 HEMOGLOBIN (BEAKER) (test code=410)  15.9 GM/DL  13.7-17.5  

 

 HEMATOCRIT (BEAKER) (test code=411)  47.6 %  40.1-51.0  

 

 MEAN CORPUSCULAR VOLUME (BEAKER) (test code=753)  88.8 fL  79.0-92.2  

 

 MEAN CORPUSCULAR HEMOGLOBIN (BEAKER) (test  29.7 pg  25.7-32.2  



 jefl=161)      

 

 MEAN CORPUSCULAR HEMOGLOBIN CONC (BEAKER) (test  33.4 GM/DL  32.3-36.5  



 code=752)      

 

 RED CELL DISTRIBUTION WIDTH (BEAKER) (test  11.9 %  11.6-14.4  



 code=412)      

 

 PLATELET COUNT (BEAKER) (test code=756)  291 K/CU MM  150-450  

 

 MEAN PLATELET VOLUME (BEAKER) (test code=754)  10.6 fL  9.4-12.4  

 

 NUCLEATED RED BLOOD CELLS (BEAKER) (test  0 /100 WBC  0-0  



 code=413)      

 

 NEUTROPHILS RELATIVE PERCENT (BEAKER) (test  93 %    



 code=429)      

 

 LYMPHOCYTES RELATIVE PERCENT (BEAKER) (test  4 %    



 code=430)      

 

 MONOCYTES RELATIVE PERCENT (BEAKER) (test  2 %    



 code=431)      

 

 EOSINOPHILS RELATIVE PERCENT (BEAKER) (test  0 %    



 code=432)      

 

 BASOPHILS RELATIVE PERCENT (BEAKER) (test  0 %    



 code=437)      

 

 NEUTROPHILS ABSOLUTE COUNT (BEAKER) (test  12.49 K/ L  1.78-5.38  



 code=670)      

 

 LYMPHOCYTES ABSOLUTE COUNT (BEAKER) (test  0.52 K/ L  1.32-3.57  



 code=414)      

 

 MONOCYTES ABSOLUTE COUNT (BEAKER) (test  0.32 K/ L  0.30-0.82  



 code=415)      

 

 EOSINOPHILS ABSOLUTE COUNT (BEAKER) (test  0.00 K/ L  0.04-0.54  



 code=416)      

 

 BASOPHILS ABSOLUTE COUNT (BEAKER) (test  0.02 K/ L  0.01-0.08  



 code=417)      

 

 IMMATURE GRANULOCYTES-RELATIVE PERCENT (BEAKER)  0 %  0-1  



 (test code=2808)      



COMPREHENSIVE METABOLIC [id number] 23:48:00





 Test Item  Value  Reference Range  Comments

 

 TOTAL PROTEIN (BEAKER)  7.9 gm/dL  6.0-8.3  



 (test code=770)      

 

 ALBUMIN (BEAKER) (test  4.6 g/dL  3.5-5.0  



 code=1145)      

 

 ALKALINE PHOSPHATASE  70 U/L    



 (BEAKER) (test code=346)      

 

 BILIRUBIN TOTAL (BEAKER)  1.6 mg/dL  0.2-1.2  



 (test code=377)      

 

 SODIUM (BEAKER) (test  133 meq/L  136-145  



 wlha=248)      

 

 POTASSIUM (BEAKER) (test  4.4 meq/L  3.5-5.1  



 code=379)      

 

 CHLORIDE (BEAKER) (test  98 meq/L    



 code=382)      

 

 CO2 (BEAKER) (test  22 meq/L  22-29  



 code=355)      

 

 BLOOD UREA NITROGEN  12 mg/dL  7-21  



 (BEAKER) (test code=354)      

 

 CREATININE (BEAKER) (test  0.92 mg/dL  0.57-1.25  



 code=358)      

 

 GLUCOSE RANDOM (BEAKER)  159 mg/dL    



 (test code=652)      

 

 CALCIUM (BEAKER) (test  9.9 mg/dL  8.4-10.2  



 code=697)      

 

 AST (SGOT) (BEAKER) (test  19 U/L  5-34  



 code=353)      

 

 ALT (SGPT) (BEAKER) (test  16 U/L  6-55  



 code=347)      

 

 EGFR (BEAKER) (test  102 mL/min/1.73 sq    ESTIMATED GFR IS NOT AS



 code=1092)  m    ACCURATE AS CREATININE



       CLEARANCE IN PREDICTING



       GLOMERULAR FILTRATION



       RATE. ESTIMATED GFR IS



       NOT APPLICABLE FOR



       DIALYSIS PATIENTS.



C-REACTIVE [id number] 23:48:00





 Test Item  Value  Reference Range  Comments

 

 C-REACTIVE PROTEIN (LAMAKER) (test code=676)  9.68 mg/dL  0.00-0.50

## 2018-12-28 VITALS — DIASTOLIC BLOOD PRESSURE: 65 MMHG | SYSTOLIC BLOOD PRESSURE: 133 MMHG

## 2018-12-28 VITALS — TEMPERATURE: 98.7 F

## 2018-12-28 VITALS — OXYGEN SATURATION: 99 %

## 2018-12-28 LAB
ALBUMIN SERPL BCP-MCNC: 3.4 G/DL (ref 3.4–5)
ALP SERPL-CCNC: 85 U/L (ref 45–117)
ALT SERPL W P-5'-P-CCNC: 36 U/L (ref 12–78)
AST SERPL W P-5'-P-CCNC: 18 U/L (ref 15–37)
BUN BLD-MCNC: 16 MG/DL (ref 7–18)
GLUCOSE SERPLBLD-MCNC: 95 MG/DL (ref 74–106)
HCT VFR BLD CALC: 43.8 % (ref 39.6–49)
LYMPHOCYTES # SPEC AUTO: 2.9 K/UL (ref 0.7–4.9)
PMV BLD: 8.5 FL (ref 7.6–11.3)
POTASSIUM SERPL-SCNC: 3.8 MMOL/L (ref 3.5–5.1)
RBC # BLD: 4.98 M/UL (ref 4.33–5.43)
UA COMPLETE W REFLEX CULTURE PNL UR: (no result)

## 2018-12-28 NOTE — EDPHYS
Physician Documentation                                                                           

 [facility]                                                                

Name: Eddie Lopez                                                                                

Age: 23 yrs                                                                                       

Sex: Male                                                                                         

: 1995                                                                                   

MRN: [id number]                                                                                   

Arrival Date: 2018                                                                          

Time: 23:16                                                                                       

Account#: [id number]                                                                            

Bed 17                                                                                            

Private MD:                                                                                       

ED Physician Adarsh Martinez                                                                     

HPI:                                                                                              

                                                                                             

01:00 This 23 yrs old  Male presents to ER via Ambulatory with complaints of Urinary pm1 

      Retention, Urinary Problem.                                                                 

01:00 The patient presents with urinary symptoms, retention. Onset: The symptoms/episode      pm1 

      began/occurred 5 day(s) ago. Modifying factors: The symptoms are alleviated by Pushing      

      against his perineum to urinate, the symptoms are aggravated by nothing. Associated         

      signs and symptoms: Pertinent negatives: abdominal pain, constipation, fever, nausea,       

      vomiting. Severity of symptoms: in the emergency department the symptoms are actually       

      worse. The patient has been recently seen at the [facility]          

      Emergency Department, for similar complaints labs were performed, was given a               

      prescription for antibiotics, 3 days ago. Patient diagnosed with prostatitis 3 days ago     

      and was given a prescription for doxycycline. He had a Hinojosa with leg bag placed. Took      

      2 days of doxycycline then went to senior care. In senior care he was not given his medications and       

      he requested to have his Hinojosa removed. After getting bonded he did not get his             

      doxycycline back. Returning to the ER with complaints of urinary retention.                 

                                                                                                  

Historical:                                                                                       

- Allergies:                                                                                      

                                                                                             

23:25 Sulfa (Sulfonamide Antibiotics);                                                        fc  

- Home Meds:                                                                                      

23:25 None [Active];                                                                          fc  

- PMHx:                                                                                           

23:25 Anxiety; Bipolar disorder; Schizophrenia;                                               fc  

- PSHx:                                                                                           

23:25 None;                                                                                   fc  

                                                                                                  

- Immunization history:: Last tetanus immunization: unknown, Flu vaccine is not up to             

  date.                                                                                           

- Social history:: Smoking status: Patient uses tobacco products, smokes one-half pack            

  cigarettes per day, Patient uses alcohol, occasionally. street drugs, marijuana.                

- Ebola Screening: : Patient negative for fever greater than or equal to 101.5 degrees            

  Fahrenheit, and additional compatible Ebola Virus Disease symptoms Patient denies               

  exposure to infectious person Patient denies travel to an Ebola-affected area in the            

  21 days before illness onset.                                                                   

                                                                                                  

                                                                                                  

ROS:                                                                                              

                                                                                             

01:00 Constitutional: Negative for fever, chills, and weight loss, Eyes: Negative for injury, pm1 

      pain, redness, and discharge, ENT: Negative for injury, pain, and discharge, Neck:          

      Negative for injury, pain, and swelling, Cardiovascular: Negative for chest pain,           

      palpitations, and edema, Respiratory: Negative for shortness of breath, cough,              

      wheezing, and pleuritic chest pain, Abdomen/GI: Negative for abdominal pain, nausea,        

      vomiting, diarrhea, and constipation, Back: Negative for injury and pain.                   

      MS/Extremity: Negative for injury and deformity, Skin: Negative for injury, rash, and       

      discoloration.                                                                              

      Neuro: Negative for headache, weakness, numbness, tingling, and seizure.                    

      : Positive for difficulty urinating, Negative for burning with urination, penile          

      discharge, penile pain, testicular pain                                                     

                                                                                                  

Exam:                                                                                             

01:00 Constitutional:  This is a well developed, well nourished patient who is awake, alert,  pm1 

      and in no acute distress. Head/Face:  Normocephalic, atraumatic. Eyes:  Pupils equal        

      round and reactive to light, extra-ocular motions intact.  Lids and lashes normal.          

      Conjunctiva and sclera are non-icteric and not injected.  Cornea within normal limits.      

      Periorbital areas with no swelling, redness, or edema. ENT:  Nares patent. No nasal         

      discharge, no septal abnormalities noted.  Tympanic membranes are normal and external       

      auditory canals are clear.  Oropharynx with no redness, swelling, or masses, exudates,      

      or evidence of obstruction, uvula midline.  Mucous membranes moist. Neck:  Trachea          

      midline, no thyromegaly or masses palpated, and no cervical lymphadenopathy.  Supple,       

      full range of motion without nuchal rigidity, or vertebral point tenderness.  No            

      Meningismus. Chest/axilla:  Normal chest wall appearance and motion.  Nontender with no     

      deformity.  No lesions are appreciated. Cardiovascular:  Regular rate and rhythm with a     

      normal S1 and S2.  No gallops, murmurs, or rubs.  Normal PMI, no JVD.  No pulse             

      deficits. Respiratory:  Lungs have equal breath sounds bilaterally, clear to                

      auscultation and percussion.  No rales, rhonchi or wheezes noted.  No increased work of     

      breathing, no retractions or nasal flaring. Abdomen/GI:  Soft, non-tender, with normal      

      bowel sounds.  No distension or tympany.  No guarding or rebound.  No evidence of           

      tenderness throughout.                                                                      

01:00 Back:  No spinal tenderness.  No costovertebral tenderness.  Full range of motion.          

      Skin:  Warm, dry with normal turgor.  Normal color with no rashes, no lesions, and no       

      evidence of cellulitis. MS/ Extremity:  Pulses equal, no cyanosis.  Neurovascular           

      intact.  Full, normal range of motion.                                                      

01:00 Abdomen/GI: Inspection: abdomen appears normal, Bowel sounds: normal, Palpation:            

      abdomen is soft and non-tender, Rectal exam: the exam is deferred, because of patient       

      request, Patient refused.                                                                   

01:00 : 75 cc present with bladder scan.                                                        

01:00 Neuro: Orientation: is normal, Motor: is normal, Sensation: is normal, no obvious gross     

      deficits, Gait: is steady, at a normal pace, without difficulty.                            

                                                                                                  

Vital Signs:                                                                                      

                                                                                             

23:25  / 86; Pulse 110; Resp 20; Temp 98.7(O); Pulse Ox 98% on R/A; Weight 95.25 kg     fc  

      (R); Height 6 ft. 2 in. (187.96 cm) (R); Pain 8/10;                                         

                                                                                             

00:45  / 70; Pulse 71; Resp 16; Pulse Ox 99% ;                                          rr5 

01:51  / 57; Pulse 75; Resp 16; Pulse Ox 99% ;                                          rr5 

02:03  / 65; Pulse 79; Resp 16; Pulse Ox 99% ;                                          rr5 

                                                                                             

23:25 Body Mass Index 26.96 (95.25 kg, 187.96 cm)                                             fc  

                                                                                                  

MDM:                                                                                              

                                                                                             

23:32 Patient medically screened.                                                             pm1 

                                                                                             

01:39 Data reviewed: vital signs. Data interpreted: Pulse oximetry: on room air is 99 %.      pm1 

      Interpretation: normal. Counseling: I had a detailed discussion with the patient and/or     

      guardian regarding: the historical points, exam findings, and any diagnostic results        

      supporting the discharge/admit diagnosis, lab results, the need for outpatient follow       

      up, a urologist, to return to the emergency department if symptoms worsen or persist or     

      if there are any questions or concerns that arise at home.                                  

                                                                                                  

                                                                                             

23:44 Order name: CBC with Diff; Complete Time: 00:59                                         pm1 

                                                                                             

23:44 Order name: CMP; Complete Time: 01:37                                                   pm1 

                                                                                             

00:11 Order name: Urine Dipstick--Ancillary (enter results); Complete Time: 01:22             em1 

                                                                                             

00:57 Order name: Urine Microscopic Only; Complete Time: 02:05                                rr5 

                                                                                             

23:43 Order name: Bladder Scanner; Complete Time: 00:12                                       pm1 

                                                                                             

00:11 Order name: Urine Dipstick-Ancillary (obtain specimen); Complete Time: 00:11            em1 

                                                                                                  

Administered Medications:                                                                         

00:00 Drug: NS 0.9% 1000 ml Route: IV; Rate: 1000 ml; Site: right forearm;                    rr5 

01:00 Follow up: Response: No adverse reaction; IV Status: Completed infusion; IV Intake:     rr5 

      1000ml                                                                                      

00:02 Drug: TORadol 30 mg Route: IVP; Site: right antecubital;                                ea  

01:05 Follow up: Response: Marked relief of symptoms; Pain is decreased                       ea  

01:11 Follow up: Response: No adverse reaction; Marked relief of symptoms                     rr5 

01:11 Drug: Rocephin 1 grams Route: IV; Rate: calculated rate; Site: right forearm;           rr5 

02:00 Follow up: Response: No adverse reaction; IV Status: Completed infusion                 rr5 

01:44 Drug: Ciprofloxacin 500 mg Route: PO;                                                   rr5 

02:00 Follow up: Response: Medication administered at discharge.                              rr5 

                                                                                                  

                                                                                                  

Disposition:                                                                                      

05:34 Co-signature as Attending Physician, Adarsh Martinez MD I agree with the assessment and tw4 

      plan of care.                                                                               

                                                                                                  

Disposition:                                                                                      

18 01:40 Discharged to Home. Impression: Acute prostatitis.                                 

- Condition is Stable.                                                                            

- Discharge Instructions: Prostatitis.                                                            

- Prescriptions for Cipro 500 mg Oral Tablet - take 1 tablet by ORAL route every 12               

  hours for 28 days; 56 tablet. Tylenol- Codeine #3 300-30 mg Oral Tablet - take 2                

  tablets by ORAL route every 6 hours As needed; 20 tablet.                                       

- Medication Reconciliation Form, Thank You Letter, Antibiotic Education, Prescription            

  Opioid Use form.                                                                                

- Follow up: Emergency Department; When: As needed; Reason: Worsening of condition.               

  Follow up: Saundra Mitchell MD; When: 2 - 3 days; Reason: Recheck today's complaints,           

  Continuance of care, Re-evaluation by your physician.                                           

- Problem is new.                                                                                 

- Symptoms have improved.                                                                         

                                                                                                  

                                                                                                  

                                                                                                  

Signatures:                                                                                       

Dispatcher [employer]                           EDMS                                                 

Melissa Oleary RN RN fc Martinez, Eric                               em1                                                  

[place] Rushing, SUNDAY                    NP   pm1                                                  

Nuñez, [place], RN                      RN   Adarsh Rehman MD MD   tw4                                                  

Ken Valdez, RN                      RN   rr5                                                  

                                                                                                  

Corrections: (The following items were deleted from the chart)                                    

00:12 00:12 URINE DIPSTICK--ANCILLARY+U.LAB.BRZ ordered. [facility]                                 EDMS

02:10 01:40 2018 01:40 Discharged to Home. Impression: Acute prostatitis. Condition is  rr5 

      Stable. Forms are Medication Reconciliation Form, Thank You Letter, Antibiotic              

      Education, Prescription Opioid Use. Follow up: Emergency Department; When: As needed;       

      Reason: Worsening of condition. Follow up: Saundra Mitchell; When: 2 - 3 days; Reason:        

      Recheck today's complaints, Continuance of care, Re-evaluation by your physician.           

      Problem is new. Symptoms have improved. pm1                                                 

                                                                                                  

**************************************************************************************************

## 2018-12-28 NOTE — ER
Nurse's Notes                                                                                     

 [facility]                                                                

Name: Eddie Lopez                                                                                

Age: 23 yrs                                                                                       

Sex: Male                                                                                         

: 1995                                                                                   

MRN: [id number]                                                                                   

Arrival Date: 2018                                                                          

Time: 23:16                                                                                       

Account#: [id number]                                                                            

Bed 17                                                                                            

Private MD:                                                                                       

Diagnosis: Acute prostatitis                                                                      

                                                                                                  

Presentation:                                                                                     

                                                                                             

23:23 Presenting complaint: Patient states: that he was here on the  and dx with          fc  

      prostatitis and given Doxy bid x 14 days. Only took 2 days worth and then went to [facility]      

      and could not get anymore. Also had pulido that the [facility] staff removed. Transition of        

      care: patient was not received from another setting of care. Onset of symptoms was          

      2018 at 22:00. Risk Assessment: Do you want to hurt yourself or someone        

      else? Patient reports no desire to harm self or others. Initial Sepsis Screen: Does the     

      patient meet any 2 criteria? HR > 90 bpm. Yes Does the patient have a suspected source      

      of infection? Yes: Dysuria/Frequency/Urgency/UTI. Care prior to arrival: None.              

23:23 Method Of Arrival: Ambulatory                                                             

23:23 Acuity: FAMILIA 3                                                                           fc  

                                                                                                  

Historical:                                                                                       

- Allergies:                                                                                      

23:25 Sulfa (Sulfonamide Antibiotics);                                                        fc  

- Home Meds:                                                                                      

23:25 None [Active];                                                                          fc  

- PMHx:                                                                                           

23:25 Anxiety; Bipolar disorder; Schizophrenia;                                               fc  

- PSHx:                                                                                           

23:25 None;                                                                                   fc  

                                                                                                  

- Immunization history:: Last tetanus immunization: unknown, Flu vaccine is not up to             

  date.                                                                                           

- Social history:: Smoking status: Patient uses tobacco products, smokes one-half pack            

  cigarettes per day, Patient uses alcohol, occasionally. street drugs, marijuana.                

- Ebola Screening: : Patient negative for fever greater than or equal to 101.5 degrees            

  Fahrenheit, and additional compatible Ebola Virus Disease symptoms Patient denies               

  exposure to infectious person Patient denies travel to an Ebola-affected area in the            

  21 days before illness onset.                                                                   

                                                                                                  

                                                                                                  

Screenin/28                                                                                             

00:00 Abuse screen: Denies threats or abuse. Denies injuries from another. Nutritional        rr5 

      screening: No deficits noted. Tuberculosis screening: No symptoms or risk factors           

      identified. Fall Risk IV access (20 points).                                                

00:00 Fall Risk Total Chen Fall Scale indicates No Risk (0-24 pts).                          rr5 

                                                                                                  

Assessment:                                                                                       

                                                                                             

23:30 General: Appears in no apparent distress. uncomfortable, Behavior is calm, cooperative, rr5 

      appropriate for age. Pain: Complains of pain in suprapubic area Pain does not radiate.      

      Pain currently is 10 out of 10 on a pain scale. Quality of pain is described as aching,     

      Pain began gradually, Is continuous.                                                        

23:30 Neuro: Level of Consciousness is awake, alert, obeys commands, Oriented to person,      rr5 

      place, time, situation. Cardiovascular: Capillary refill < 3 seconds Patient's skin is      

      warm and dry. Respiratory: Airway is patent Respiratory effort is even, unlabored,          

      Respiratory pattern is regular, symmetrical. GI: No signs and/or symptoms were reported     

      involving the gastrointestinal system. : Reports urinary retention. EENT: No signs        

      and/or symptoms were reported regarding the EENT system. Derm: Skin is intact, Skin         

      temperature is warm. Musculoskeletal: Capillary refill < 3 seconds.                         

23:50 Reassessment: Patient appears in no apparent distress at this time. bladder scan 74ml   rr5 

      result. informed ED provider to hold the pulido insertion if patient can pass urine.         

                                                                                             

00:15 Reassessment: Patient appears in no apparent distress at this time. Patient and/or      rr5 

      family updated on plan of care and expected duration. Pain level reassessed. no             

      complaints made Patient states feeling better. Patient states symptoms have improved.       

01:30 Reassessment: Patient appears in no apparent distress at this time. Patient and/or      rr5 

      family updated on plan of care and expected duration. Pain level reassessed. asleep         

      comfortably on bed. no complaints made. pain free. Patient states feeling better.           

      Patient states symptoms have improved.                                                      

02:03 Reassessment: Patient appears in no apparent distress at this time. discharge           rr5 

      instruction given and explained without complaints made.                                    

                                                                                                  

Vital Signs:                                                                                      

                                                                                             

23:25  / 86; Pulse 110; Resp 20; Temp 98.7(O); Pulse Ox 98% on R/A; Weight 95.25 kg     fc  

      (R); Height 6 ft. 2 in. (187.96 cm) (R); Pain 8/10;                                         

                                                                                             

00:45  / 70; Pulse 71; Resp 16; Pulse Ox 99% ;                                          rr5 

01:51  / 57; Pulse 75; Resp 16; Pulse Ox 99% ;                                          rr5 

02:03  / 65; Pulse 79; Resp 16; Pulse Ox 99% ;                                          rr5 

                                                                                             

23:25 Body Mass Index 26.96 (95.25 kg, 187.96 cm)                                               

                                                                                                  

ED Course:                                                                                        

                                                                                             

23:16 Patient arrived in ED.                                                                  es  

23:25 Triage completed.                                                                       fc  

23:25 Arm band placed on Patient placed in an exam room, on a stretcher.                      fc  

23:30 Patient has correct armband on for positive identification. Placed in gown. Bed in low  rr5 

      position. Call light in reach. Pulse ox on. NIBP on.                                        

23:32 Phoenix Rushing NP is PHCP.                                                           pm1 

23:32 Adarsh Martinez MD is Attending Physician.                                            pm1 

23:50 Inserted saline lock: 20 gauge in right antecubital area, using aseptic technique.      rr5 

      Blood collected. lexus FLETCHER.                                                                  

                                                                                             

00:12 Ken aVldez RN is Primary Nurse.                                                    rr5 

01:39 Saundra Mitchell MD is Referral Physician.                                              pm1 

01:58 IV discontinued, intact, bleeding controlled, No redness/swelling at site. Pressure     rr5 

      dressing applied.                                                                           

01:59 No provider procedures requiring assistance completed.                                  rr5 

                                                                                                  

Administered Medications:                                                                         

00:00 Drug: NS 0.9% 1000 ml Route: IV; Rate: 1000 ml; Site: right forearm;                    rr5 

01:00 Follow up: Response: No adverse reaction; IV Status: Completed infusion; IV Intake:     rr5 

      1000ml                                                                                      

00:02 Drug: TORadol 30 mg Route: IVP; Site: right antecubital;                                ea  

01:05 Follow up: Response: Marked relief of symptoms; Pain is decreased                       ea  

01:11 Follow up: Response: No adverse reaction; Marked relief of symptoms                     rr5 

01:11 Drug: Rocephin 1 grams Route: IV; Rate: calculated rate; Site: right forearm;           rr5 

02:00 Follow up: Response: No adverse reaction; IV Status: Completed infusion                 rr5 

01:44 Drug: Ciprofloxacin 500 mg Route: PO;                                                   rr5 

02:00 Follow up: Response: Medication administered at discharge.                              rr5 

                                                                                                  

                                                                                                  

Intake:                                                                                           

01:00 IV: 1000ml; Total: 1000ml.                                                              rr5 

                                                                                                  

Outcome:                                                                                          

01:40 Discharge ordered by MD.                                                                pm1 

01:59 Discharged to home ambulatory, with family.                                             rr5 

01:59 Condition: stable                                                                           

01:59 Discharge instructions given to patient, family, Instructed on discharge instructions,      

      follow up and referral plans. medication usage, Demonstrated understanding of               

      instructions, follow-up care, medications, Prescriptions given X 2.                         

02:10 Patient left the ED.                                                                    rr5 

                                                                                                  

Signatures:                                                                                       

Luna Toribio Felicia, RN                   RN   Phoenix Traylor, SUNDAY                    NP   pm1                                                  

Lexus Nuñez RN                      RN   Ken Massey RN                      RN   rr5                                                  

                                                                                                  

**************************************************************************************************

## 2022-08-19 NOTE — EDPHYS
Callback#[phone]  Onset:  1 month ago  Location / description: Coughs up phlegm but cannot expectorate. Coughing spells, shortness of breath when coughing, denies when at rest. Denies chest pain. Denies fever, last checked yesterday. Denies chills. Feels a little better compared to day of discharge.   Precipitating Factors: Diagnosed with pneumonia during hospitalization at [facility] from 8-8 thru 8-11. Completed antibiotic that was taken for 5 days. Asthmatic  Pain Scale (1-10), 10 highest: 0/10  Associated Symptoms: see above  What improves / worsens symptoms: Albuterol improves cough and shortness of breath  Symptom specific medications: Tussin, Albuterol inhaler taken during coughing spells. Takes 3-4 puffs.   LMP : No LMP recorded (lmp unknown). Patient is postmenopausal.  Are you pregnant or breast feeding: denies  Recent visits (last 3-4 weeks) for same reason or recent surgery: seen on 8-, no recent surgeries. Was scheduled for bronchoscopy today that was cancelled.     PLAN:   See/Call Provider within 24 hours    Patient/Caller agrees to follow recommendations if advised by doctor. Patient needs a release form to return to work on 9-2-2022 as previously discussed with Dr. Arredondo    Reason for Disposition  • Continuous (nonstop) coughing interferes with work or school    Protocols used: PNEUMONIA ON ANTIBIOTIC POST-HOSPITALIZATION FOLLOW-UP CALL-A-       Physician Documentation                                                                           

 [facility]                                                                

Name: Eddie Lopez                                                                                

Age: 23 yrs                                                                                       

Sex: Male                                                                                         

: 1995                                                                                   

MRN: [id number]                                                                                   

Arrival Date: 2018                                                                          

Time: 13:45                                                                                       

Account#: [id number]                                                                            

Bed 10                                                                                            

Private MD: None, None                                                                            

ED Physician Quentin Edmond                                                                       

HPI:                                                                                              

                                                                                             

14:24 This 23 yrs old  Male presents to ER via Ambulatory with complaints of Abscess.gs  

14:24 the patient presents with a swollen area of the right nostril. Onset: The               gs  

      symptoms/episode began/occurred 2 day(s) ago. Possible cause(s): unknown. Associated        

      signs and symptoms: Pertinent negatives: fever. Severity of symptoms: At their worst        

      the symptoms were moderate, in the emergency department the symptoms are unchanged. The     

      patient has not experienced similar symptoms in the past.                                   

                                                                                                  

Historical:                                                                                       

- Allergies:                                                                                      

13:51 Sulfa (Sulfonamide Antibiotics);                                                        sv  

- PMHx:                                                                                           

13:51 Anxiety; Bipolar disorder; Schizophrenia;                                               sv  

- PSHx:                                                                                           

13:51 None;                                                                                   sv  

                                                                                                  

- Immunization history:: Flu vaccine is not up to date.                                           

- Social history:: Smoking status: Patient uses tobacco products, denies chronic                  

  smoking, but will smoke occasionally.                                                           

- Ebola Screening: : No symptoms or risks identified at this time.                                

                                                                                                  

                                                                                                  

ROS:                                                                                              

14:24 All other systems are negative.                                                         gs  

                                                                                                  

Exam:                                                                                             

14:24 Head/Face:  Normocephalic, atraumatic. Eyes:  Pupils equal round and reactive to light, gs  

      extra-ocular motions intact.  Lids and lashes normal.  Conjunctiva and sclera are           

      non-icteric and not injected.  Cornea within normal limits.  Periorbital areas with no      

      swelling, redness, or edema. Neck:  Trachea midline, no thyromegaly or masses palpated,     

      and no cervical lymphadenopathy.  Supple, full range of motion without nuchal rigidity,     

      or vertebral point tenderness.  No Meningismus. Cardiovascular:  Regular rate and           

      rhythm with a normal S1 and S2.  No gallops, murmurs, or rubs.  Normal PMI, no JVD.  No     

      pulse deficits. Respiratory:  Lungs have equal breath sounds bilaterally, clear to          

      auscultation and percussion.  No rales, rhonchi or wheezes noted.  No increased work of     

      breathing, no retractions or nasal flaring. Abdomen/GI:  Soft, non-tender, with normal      

      bowel sounds.  No distension or tympany.  No guarding or rebound.  No evidence of           

      tenderness throughout.                                                                      

14:24 Constitutional: The patient appears alert, awake.                                           

14:24 ENT: Nose: small furuncle right nare.                                                       

                                                                                                  

Vital Signs:                                                                                      

13:51  / 84; Pulse 64; Resp 16; Temp 98.4; Pulse Ox 100% ; Weight 99.79 kg; Height 6    sv  

      ft. 2 in. (187.96 cm); Pain 7/10;                                                           

13:51 Body Mass Index 28.25 (99.79 kg, 187.96 cm)                                             sv  

                                                                                                  

MDM:                                                                                              

14:18 Patient medically screened.                                                               

14:24 Data reviewed: vital signs, nurses notes. Counseling: I had a detailed discussion with    

      the patient and/or guardian regarding: the historical points, exam findings, and any        

      diagnostic results supporting the discharge/admit diagnosis, the presence of at least       

      one elevated blood pressure reading (>120/80) during this emergency department visit.       

      Special discussion: I have referred the patient to see his PCP for further evaluation       

      of high blood pressure.                                                                     

                                                                                                  

Administered Medications:                                                                         

No medications were administered                                                                  

                                                                                                  

                                                                                                  

Disposition:                                                                                      

18 14:27 Discharged to Home. Impression: Abscess, furuncle and carbuncle of nose.           

- Condition is Stable.                                                                            

- Discharge Instructions: Skin Abscess.                                                           

- Prescriptions for Bactroban 2 % Topical Ointment - Apply to affected area 1                     

  application by TOPICAL route every 12 hours; 30 gram. Clindamycin HCl 150 mg Oral               

  Capsule - take 1 capsule by ORAL route every 6 hours for 10 days; 40 capsule.                   

- Medication Reconciliation Form, Thank You Letter, Antibiotic Education, Prescription            

  Opioid Use form.                                                                                

- Follow up: Private Physician; When: 2 - 3 days; Reason: Re-evaluation by your                   

  physician.                                                                                      

                                                                                                  

                                                                                                  

                                                                                                  

Signatures:                                                                                       

Brittany Carey RN                    RN                                                      

Blanche Perdomo RN                     RN                                                      

Quentin Edmond MD MD                                                      

                                                                                                  

Corrections: (The following items were deleted from the chart)                                    

14:41 14:27 2018 14:27 Discharged to Home. Impression: Abscess, furuncle and carbuncle  iw  

      of nose. Condition is Stable. Forms are Medication Reconciliation Form, Thank You           

      Letter, Antibiotic Education, Prescription Opioid Use. Follow up: Private Physician;        

      When: 2 - 3 days; Reason: Re-evaluation by your physician.                                

                                                                                                  

**************************************************************************************************

## 2024-08-27 ENCOUNTER — HOSPITAL ENCOUNTER (EMERGENCY)
Dept: HOSPITAL 97 - ER | Age: 29
Discharge: HOME | End: 2024-08-27
Payer: SELF-PAY

## 2024-08-27 VITALS — OXYGEN SATURATION: 99 % | TEMPERATURE: 97 F

## 2024-08-27 VITALS — SYSTOLIC BLOOD PRESSURE: 136 MMHG | DIASTOLIC BLOOD PRESSURE: 77 MMHG

## 2024-08-27 DIAGNOSIS — M62.830: Primary | ICD-10-CM

## 2024-08-27 PROCEDURE — 99284 EMERGENCY DEPT VISIT MOD MDM: CPT

## 2024-08-27 PROCEDURE — 71046 X-RAY EXAM CHEST 2 VIEWS: CPT

## 2024-08-27 NOTE — EDPHYS
Physician Documentation                                                                           

 [facility]                                                                 

Name: Eddie Lopez                                                                                

Age: 28 yrs                                                                                       

Sex: Male                                                                                         

: 1995                                                                                   

MRN: [id number]                                                                                   

Arrival Date: 2024                                                                          

Time: 06:55                                                                                       

Account#: [id number]                                                                            

Bed 19                                                                                            

Private MD:                                                                                       

ED Physician Mamadou Napier                                                                         

HPI:                                                                                              

                                                                                             

07:27 This 28 yrs old Male presents to ER via EMS with complaints of Back Pain.               ms3 

07:27 .                                                                                       ms3 

07:32 28-year-old male with past medical history of anxiety, bipolar, schizophrenia presents  ms3 

      to the emergency department for left thoracic pain that is been ongoing for 2 days.         

      Patient denies trauma to the area. He denies pain when laying flat and states movement      

      makes the pain a 10/10.                                                                     

                                                                                                  

Historical:                                                                                       

- Allergies:                                                                                      

07:08 Sulfa (Sulfonamide Antibiotics);                                                        ha1 

- PMHx:                                                                                           

07:08 Anxiety; Bipolar disorder; Schizophrenia;                                               ha1 

                                                                                                  

- Immunization history:: Adult Immunizations up to date.                                          

- Infectious Disease History:: Denies.                                                            

- Social history:: Smoking status: Patient reports the use of cigarette tobacco                   

  products, smokes one-half pack cigarettes per day.                                              

                                                                                                  

                                                                                                  

ROS:                                                                                              

07:32 Constitutional: Negative for fever, and chills. Neck: Negative for injury, pain, and    ms3 

      swelling, Cardiovascular: Negative for chest pain, and palpitations. Respiratory:           

      Negative for shortness of breath, cough, wheezing, and pleuritic chest pain,                

      Abdomen/GI: Negative for abdominal pain, nausea, vomiting, diarrhea, and constipation,      

07:32 MS/extremity: Positive for pain, tenderness, of the Left scapular area,                     

                                                                                                  

Exam:                                                                                             

07:32 Constitutional:  This is a well developed, well nourished patient who is awake, alert,  ms3 

      and in no acute distress. Chest/axilla:  Normal chest wall appearance and motion.           

      Nontender with no deformity.   Cardiovascular:  Regular rate and rhythm with a normal       

      S1 and S2.  No gallops, murmurs, or rubs.  Normal PMI, no JVD.  No pulse deficits.          

      Respiratory:  Lungs have equal breath sounds bilaterally, clear to auscultation and         

      percussion.  No rales, rhonchi or wheezes noted.  No increased work of breathing, no        

      retractions or nasal flaring. Abdomen/GI:  Soft, non-tender, with normal bowel sounds.      

      No distension or tympany.  No guarding or rebound.  No evidence of tenderness               

      throughout. Skin:  Warm, dry with normal turgor.  Normal color with no rashes, no           

      lesions, and no evidence of cellulitis. MS/ Extremity:  Pulses equal, no cyanosis.          

      Neurovascular intact.  Full, normal range of motion.                                        

07:32 Back: pain, of the  left scapular area, No pain when laying still severe pain when          

      sitting up/movement,                                                                        

                                                                                                  

Vital Signs:                                                                                      

07:00  / 76; Pulse 77; Resp 17 S; Temp 97(T); Pulse Ox 99% ; Weight 99.79 kg; Height 6  ha1 

      ft. 2 in. ;                                                                                 

07:50  / 77; Pulse 74; Resp 18; Pulse Ox 99% on R/A; Pain 8/10;                         ld1 

07:00 Body Mass Index 28.25 (99.79 kg, 187.96 cm)                                             ha1 

07:50 Pain Scale: Adult                                                                       ld1 

                                                                                                  

MDM:                                                                                              

07:31 Patient medically screened.                                                             ms3 

07:32 Differential diagnosis: Fracture vertebral fracture, muscle spasm.                      ms3 

12:16 Data reviewed: vital signs, nurses notes, radiologic studies, plain films, and as a     ms3 

      result, I will discharge patient. I considered the following discharge prescriptions or     

      medication management in the emergency department Medications were administered in the      

      Emergency Department. See MAR. Independent interpretation of the following test(s) in       

      the Emergency Department X-Ray: My interpretation is CXR images reviewed by me do not       

      reveal pneumonia or fracture. Counseling: I had a detailed discussion with the patient      

      and/or guardian regarding the historical points, exam findings, and any diagnostic          

      results supporting the discharge/admit diagnosis, radiology results, the need for           

      outpatient follow up, to return to the emergency department if symptoms worsen or           

      persist or if there are any questions or concerns that arise at home. Special               

      discussion: I discussed with the patient/guardian in detail that at this point there is     

      no indication for admission to the hospital. It is understood, however, that if the         

      symptoms persist or worsen the patient needs to return immediately for re-evaluation.       

      ED course: On reevaluation patient symptoms improved, patient is alert and oriented x       

      4, no apparent distress, nontoxic-appearing, ambulatory in the emergency department,        

      speaking full sentences. Patient to follow-up with his spine surgeon in 2 to 3 days.        

      Patient understands and agrees with plan. All questions were answered. Return               

      precautions discussed include worsening symptoms, or any other concerns.                    

                                                                                                  

                                                                                             

07:14 Order name: Chest Pa And Lat (2 Views) XRAY; Complete Time: 08:05                       ms3 

                                                                                                  

Administered Medications:                                                                         

07:17 Drug: HYDROcodone-acetaminophen PO 5 mg-325 mg 1 tabs PO once Route: PO;                ld1 

07:50 Follow up: Response: No adverse reaction                                                ld1 

07:17 Drug: Diazepam PO 5 mg PO once Route: PO;                                               ld1 

08:00 Follow up: Response: No adverse reaction                                                ld1 

                                                                                                  

                                                                                                  

Disposition Summary:                                                                              

24 08:20                                                                                    

Discharge Ordered                                                                                 

 Notes:       Location: Home                                                                        
  ms3

      Condition: Stable                                                                       ms3 

      Diagnosis                                                                                   

        - Back pain                                                                           ms3 

        - Muscle spasm of back                                                                ms3 

      Followup:                                                                               ms3 

        - With: Private Physician                                                                  

        - When: 2 - 3 days                                                                         

        - Reason: Re-evaluation by your physician                                                  

      Discharge Instructions:                                                                     

        - Discharge Summary Sheet                                                             ms3 

        - Spasticity                                                                          ms3 

        - Back Exercises, Easy-to-Read                                                        ms3 

      Forms:                                                                                      

        - Medication Reconciliation Form                                                      ms3 

        - Antibiotic Education                                                                ms3 

        - Prescription Opioid Use                                                             ms3 

        - Patient Portal Instructions                                                         ms3 

        - Leadership Thank You Letter                                                         ms3 

      Prescriptions:                                                                              

        - Ibuprofen 600 mg Oral Tablet                                                             

            - take 1 tablet ORAL route every 6 hours As needed take with food; 30 tablet;     ms3 

      Refills: 0, Product Selection Permitted                                                     

        - Cyclobenzaprine 10 mg Oral Tablet                                                        

            - take 1 tablet ORAL route every 8 hours As needed; 30 tablet; Refills: 0,        ms3 

      Product Selection Permitted                                                                 

Signatures:                                                                                       

Dispatcher [employer]                           EDMS                                                 

Mamadou Napier DO                        DO   ms3                                                  

Josefina Napier RN RN   ld1                                                  

Lien Pichardo RN                        RN   ha1                                                  

                                                                                                  

Corrections: (The following items were deleted from the chart)                                    

07:14 07:14 Chest Pa And Lat (2 Views)+[id number].[email or web address] ordered. EDMS                              EDMS

                                                                                                  

**************************************************************************************************

## 2024-08-27 NOTE — RAD REPORT
EXAM DESCRIPTION:  RAD - Chest Pa And Lat (2 Views) - 8/27/2024 7:35 am

 

CLINICAL HISTORY:  back pain

 

COMPARISON:  CHEST PA AND LAT 2 VIEW dated 1/15/2015; CHEST PA AND LAT 2 VIEW dated 5/27/2013

 

TECHNIQUE:  PA and lateral views of the chest were obtained.

 

FINDINGS:  The lungs are clear. Heart size is normal and central vasculature is within normal limits.
 No pleural effusion or pneumothorax seen. No acute bony finding noted.

 

IMPRESSION:  No acute cardiopulmonary process.

## 2024-08-27 NOTE — ER
Nurse's Notes                                                                                     

 [facility]                                                                 

Name: Eddie Lopez                                                                                

Age: 28 yrs                                                                                       

Sex: Male                                                                                         

: 1995                                                                                   

MRN: [id number]                                                                                   

Arrival Date: 2024                                                                          

Time: 06:55                                                                                       

Account#: [id number]                                                                            

Bed 19                                                                                            

Private MD:                                                                                       

Diagnosis: Back pain;Muscle spasm of back                                                         

                                                                                                  

Presentation:                                                                                     

                                                                                             

07:00 Chief complaint: EMS states: 28 year old male reports sudden back pain.                 ha1 

07:00 Method Of Arrival: [facility]                                                     ha1 

07:00 Coronavirus screen: Vaccine status: Patient reports being unvaccinated. Ebola Screen:   ha1 

      No symptoms or risks identified at this time. Initial Sepsis Screen: Does the patient       

      meet any 2 criteria? No. Patient's initial sepsis screen is negative. Does the patient      

      have a suspected source of infection? No. Patient's initial sepsis screen is negative.      

      Risk Assessment: Do you want to hurt yourself or someone else? Patient reports no           

      desire to harm self or others. Onset of symptoms was 2024.                       

07:00 Acuity: FAMILIA 4                                                                           ha1 

                                                                                                  

Triage Assessment:                                                                                

07:00 General: Appears uncomfortable, Behavior is calm, cooperative. Pain: Complains of pain  ha1 

      in back Pain does not radiate. Pain currently is 9 out of 10 on a pain scale. Quality       

      of pain is described as sharp, stabbing. Neuro: Level of Consciousness is awake, alert,     

      obeys commands, Oriented to person, place, time, situation. Cardiovascular: Capillary       

      refill < 3 seconds Patient's skin is warm and dry. Respiratory: Airway is patent            

      Respiratory effort is even, unlabored, Respiratory pattern is regular, symmetrical.         

      Musculoskeletal: Circulation, motion, and sensation intact. Range of motion: intact in      

      all extremities, Reports pain in back.                                                      

                                                                                                  

Historical:                                                                                       

- Allergies:                                                                                      

07:08 Sulfa (Sulfonamide Antibiotics);                                                        ha1 

- PMHx:                                                                                           

07:08 Anxiety; Bipolar disorder; Schizophrenia;                                               ha1 

                                                                                                  

- Immunization history:: Adult Immunizations up to date.                                          

- Infectious Disease History:: Denies.                                                            

- Social history:: Smoking status: Patient reports the use of cigarette tobacco                   

  products, smokes one-half pack cigarettes per day.                                              

                                                                                                  

                                                                                                  

Screenin:09 Abuse screen: Denies threats or abuse. Denies injuries from another. Nutritional        ha1 

      screening: No deficits noted. Tuberculosis screening: No symptoms or risk factors           

      identified.                                                                                 

07:50 [facility] ED Fall Risk Assessment (Adult) History of falling in the last 3 months,       ld1 

      including since admission No falls in past 3 months (0 pts) Confusion or Disorientation     

      No (0 pts) Intoxicated or Sedated No (0 pts) Impaired Gait No (0 pts) Mobility Assist       

      Device Used No (0 pt) Altered Elimination No (0 pt) Score/Fall Risk Level 0 - 2 = Low       

      Risk Oriented to surroundings, Maintained a safe environment, Educated pt \T\ family on     

      fall prevention, incl call for assistance when getting out of bed, Assessed \T\             

      reinforced patient's understanding of fall precautions, Provided non-skid footwear,         

      Hourly rounding (assess needs \T\ fall precautionary measures) done, Used ambulatory aids   

      as needed (educated on \T\ assisted with), Used gait belt as appropriate.                   

                                                                                                  

Assessment:                                                                                       

07:50 General: Appears in no apparent distress. uncomfortable, Behavior is calm, cooperative, ld1 

      appropriate for age.                                                                        

07:50 Pain: Complains of pain in back Pain does not radiate. Pain currently is 8 out of 10 on ld1 

      a pain scale. Quality of pain is described as sharp, shooting, throbbing, Pain began        

      suddenly, Is continuous. Neuro: Level of Consciousness is awake, alert, obeys commands,     

      Oriented to person, place, time, situation, Appropriate for age. Cardiovascular:            

      Capillary refill < 3 seconds Patient's skin is warm and dry. Respiratory: Airway is         

      patent Respiratory effort is even, unlabored. GI: Abdomen is flat, non-distended. :       

      No signs and/or symptoms were reported regarding the genitourinary system. EENT: No         

      signs and/or symptoms were reported regarding the EENT system. Derm: No signs and/or        

      symptoms reported regarding the dermatologic system.                                        

                                                                                                  

Vital Signs:                                                                                      

07:00  / 76; Pulse 77; Resp 17 S; Temp 97(T); Pulse Ox 99% ; Weight 99.79 kg; Height 6  ha1 

      ft. 2 in. ;                                                                                 

07:50  / 77; Pulse 74; Resp 18; Pulse Ox 99% on R/A; Pain 8/10;                         ld1 

07:00 Body Mass Index 28.25 (99.79 kg, 187.96 cm)                                             ha1 

07:50 Pain Scale: Adult                                                                       ld1 

                                                                                                  

ED Course:                                                                                        

06:58 Patient arrived in ED.                                                                  ha1 

07:02 Napier, Mamadou, DO is Attending Physician.                                                ms3 

07:08 Triage completed.                                                                       ha1 

07:14 Josefina Napier, RN is Primary Nurse.                                                      ld1 

07:37 Chest Pa And Lat (2 Views) XRAY In Process Unspecified.                                 EDMS

07:50 Patient has correct armband on for positive identification. Placed in gown. Bed in low  ld1 

      position. Call light in reach. Side rails up X2. Cardiac monitor on. Pulse ox on. NIBP      

      on. Door closed. Noise minimized. Warm blanket given.                                       

07:50 No provider procedures requiring assistance completed. Patient did not have IV access   ld1 

      during this emergency room visit.                                                           

08:30 Arm band placed on right wrist.                                                         ld1 

                                                                                                  

Administered Medications:                                                                         

07:17 Drug: HYDROcodone-acetaminophen PO 5 mg-325 mg 1 tabs PO once Route: PO;                ld1 

07:50 Follow up: Response: No adverse reaction                                                ld1 

07:17 Drug: Diazepam PO 5 mg PO once Route: PO;                                               ld1 

08:00 Follow up: Response: No adverse reaction                                                ld1 

                                                                                                  

                                                                                                  

Medication:                                                                                       

07:50 VIS not applicable for this client.                                                     ld1 

                                                                                                  

Outcome:                                                                                          

07:50 Discharged to home ambulatory,                                                          ld1 

07:50 Condition: stable                                                                           

07:50 Discharge instructions given to patient, Instructed on discharge instructions, follow       

      up and referral plans. Demonstrated understanding of instructions, follow-up care,          

      medications, Prescriptions given X 2,                                                       

08:20 Discharge ordered by MD.                                                                ms3 

08:30 Patient left the ED.                                                                    ld1 

                                                                                                  

Signatures:                                                                                       

Dispatcher [employer]                           EDMS                                                 

Mamadou Napier DO DO   ms3                                                  

Josefina Napier, RN                        RN   ld1                                                  

Lien Pichardo RN                        RN   ha1                                                  

                                                                                                  

**************************************************************************************************

## 2024-09-25 ENCOUNTER — HOSPITAL ENCOUNTER (EMERGENCY)
Dept: HOSPITAL 97 - ER | Age: 29
Discharge: HOME | End: 2024-09-25
Payer: SELF-PAY

## 2024-09-25 VITALS — TEMPERATURE: 97.8 F | DIASTOLIC BLOOD PRESSURE: 83 MMHG | SYSTOLIC BLOOD PRESSURE: 150 MMHG

## 2024-09-25 VITALS — OXYGEN SATURATION: 99 %
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
## 2024-09-25 DIAGNOSIS — M79.674: ICD-10-CM

## 2024-09-25 DIAGNOSIS — S80.812A: ICD-10-CM

## 2024-09-25 DIAGNOSIS — S20.311A: Primary | ICD-10-CM

## 2024-09-25 DIAGNOSIS — M54.50: ICD-10-CM

## 2024-09-25 DIAGNOSIS — M79.605: ICD-10-CM

## 2024-09-25 PROCEDURE — 99285 EMERGENCY DEPT VISIT HI MDM: CPT

## 2024-09-25 PROCEDURE — 96372 THER/PROPH/DIAG INJ SC/IM: CPT

## 2024-09-25 PROCEDURE — 71045 X-RAY EXAM CHEST 1 VIEW: CPT

## 2024-09-25 PROCEDURE — 72131 CT LUMBAR SPINE W/O DYE: CPT
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
## 2024-09-25 NOTE — RAD REPORT
Exam:Hand Right 3 View



HISTORY: Right hand pain



FINDINGS:



No fracture or dislocation seen



Reported By: Rjei Starks 

Electronically Signed:  9/25/2024 7:58 AM

## 2024-09-25 NOTE — RAD REPORT
Exam: Left tib-fib 2 view



CLINICAL HISTORY: Leg pain



FINDINGS: No fracture seen



Reported By: Reji Starks 

Electronically Signed:  9/25/2024 8:06 AM

## 2024-09-25 NOTE — RAD REPORT
Exam:Foot Right 3 View



CLINICAL HISTORY: Right foot pain



FINDINGS:



No fracture or dislocation seen



Reported By: Reji Starks 

Electronically Signed:  9/25/2024 7:59 AM
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
